# Patient Record
Sex: MALE | Race: OTHER | HISPANIC OR LATINO | URBAN - METROPOLITAN AREA
[De-identification: names, ages, dates, MRNs, and addresses within clinical notes are randomized per-mention and may not be internally consistent; named-entity substitution may affect disease eponyms.]

---

## 2017-10-26 ENCOUNTER — INPATIENT (INPATIENT)
Facility: HOSPITAL | Age: 72
LOS: 3 days | Discharge: HOME HEALTH | End: 2017-10-30
Attending: INTERNAL MEDICINE

## 2017-10-26 DIAGNOSIS — I16.1 HYPERTENSIVE EMERGENCY: ICD-10-CM

## 2017-11-01 DIAGNOSIS — E87.6 HYPOKALEMIA: ICD-10-CM

## 2017-11-01 DIAGNOSIS — I63.9 CEREBRAL INFARCTION, UNSPECIFIED: ICD-10-CM

## 2017-11-01 DIAGNOSIS — Z79.84 LONG TERM (CURRENT) USE OF ORAL HYPOGLYCEMIC DRUGS: ICD-10-CM

## 2017-11-01 DIAGNOSIS — I16.1 HYPERTENSIVE EMERGENCY: ICD-10-CM

## 2017-11-01 DIAGNOSIS — Z87.891 PERSONAL HISTORY OF NICOTINE DEPENDENCE: ICD-10-CM

## 2017-11-01 DIAGNOSIS — I67.4 HYPERTENSIVE ENCEPHALOPATHY: ICD-10-CM

## 2017-11-01 DIAGNOSIS — I10 ESSENTIAL (PRIMARY) HYPERTENSION: ICD-10-CM

## 2017-11-01 DIAGNOSIS — I73.9 PERIPHERAL VASCULAR DISEASE, UNSPECIFIED: ICD-10-CM

## 2017-11-01 DIAGNOSIS — R27.0 ATAXIA, UNSPECIFIED: ICD-10-CM

## 2017-11-01 DIAGNOSIS — R47.1 DYSARTHRIA AND ANARTHRIA: ICD-10-CM

## 2017-11-01 DIAGNOSIS — H10.9 UNSPECIFIED CONJUNCTIVITIS: ICD-10-CM

## 2017-11-01 DIAGNOSIS — Z79.02 LONG TERM (CURRENT) USE OF ANTITHROMBOTICS/ANTIPLATELETS: ICD-10-CM

## 2017-11-01 DIAGNOSIS — E11.9 TYPE 2 DIABETES MELLITUS WITHOUT COMPLICATIONS: ICD-10-CM

## 2017-11-01 DIAGNOSIS — G20 PARKINSON'S DISEASE: ICD-10-CM

## 2021-04-18 ENCOUNTER — INPATIENT (INPATIENT)
Facility: HOSPITAL | Age: 76
LOS: 1 days | Discharge: HOME HEALTH | End: 2021-04-20
Attending: INTERNAL MEDICINE | Admitting: INTERNAL MEDICINE
Payer: MEDICARE

## 2021-04-18 ENCOUNTER — TRANSCRIPTION ENCOUNTER (OUTPATIENT)
Age: 76
End: 2021-04-18

## 2021-04-18 VITALS
RESPIRATION RATE: 18 BRPM | OXYGEN SATURATION: 97 % | SYSTOLIC BLOOD PRESSURE: 151 MMHG | DIASTOLIC BLOOD PRESSURE: 68 MMHG | HEART RATE: 60 BPM | TEMPERATURE: 98 F

## 2021-04-18 DIAGNOSIS — M67.911 UNSPECIFIED DISORDER OF SYNOVIUM AND TENDON, RIGHT SHOULDER: Chronic | ICD-10-CM

## 2021-04-18 LAB
ALBUMIN SERPL ELPH-MCNC: 4.1 G/DL — SIGNIFICANT CHANGE UP (ref 3.5–5.2)
ALP SERPL-CCNC: 45 U/L — SIGNIFICANT CHANGE UP (ref 30–115)
ALT FLD-CCNC: 8 U/L — SIGNIFICANT CHANGE UP (ref 0–41)
ANION GAP SERPL CALC-SCNC: 13 MMOL/L — SIGNIFICANT CHANGE UP (ref 7–14)
APTT BLD: 29.9 SEC — SIGNIFICANT CHANGE UP (ref 27–39.2)
AST SERPL-CCNC: 14 U/L — SIGNIFICANT CHANGE UP (ref 0–41)
BASE EXCESS BLDV CALC-SCNC: 1.6 MMOL/L — SIGNIFICANT CHANGE UP (ref -2–2)
BASOPHILS # BLD AUTO: 0.06 K/UL — SIGNIFICANT CHANGE UP (ref 0–0.2)
BASOPHILS NFR BLD AUTO: 0.7 % — SIGNIFICANT CHANGE UP (ref 0–1)
BILIRUB SERPL-MCNC: 0.2 MG/DL — SIGNIFICANT CHANGE UP (ref 0.2–1.2)
BUN SERPL-MCNC: 33 MG/DL — HIGH (ref 10–20)
BUN SERPL-MCNC: 36 MG/DL — HIGH (ref 10–20)
CALCIUM SERPL-MCNC: 9 MG/DL — SIGNIFICANT CHANGE UP (ref 8.5–10.1)
CALCIUM SERPL-MCNC: 9.5 MG/DL — SIGNIFICANT CHANGE UP (ref 8.5–10.1)
CHLORIDE SERPL-SCNC: 98 MMOL/L — SIGNIFICANT CHANGE UP (ref 98–110)
CO2 SERPL-SCNC: 22 MMOL/L — SIGNIFICANT CHANGE UP (ref 17–32)
CO2 SERPL-SCNC: 24 MMOL/L — SIGNIFICANT CHANGE UP (ref 17–32)
CREAT SERPL-MCNC: 1.9 MG/DL — HIGH (ref 0.7–1.5)
CREAT SERPL-MCNC: 2.1 MG/DL — HIGH (ref 0.7–1.5)
EOSINOPHIL # BLD AUTO: 0.17 K/UL — SIGNIFICANT CHANGE UP (ref 0–0.7)
EOSINOPHIL NFR BLD AUTO: 2.1 % — SIGNIFICANT CHANGE UP (ref 0–8)
ETHANOL SERPL-MCNC: <10 MG/DL — SIGNIFICANT CHANGE UP
GAS PNL BLDV: SIGNIFICANT CHANGE UP
GLUCOSE BLDC GLUCOMTR-MCNC: 148 MG/DL — HIGH (ref 70–99)
GLUCOSE BLDC GLUCOMTR-MCNC: 88 MG/DL — SIGNIFICANT CHANGE UP (ref 70–99)
GLUCOSE SERPL-MCNC: 153 MG/DL — HIGH (ref 70–99)
GLUCOSE SERPL-MCNC: 98 MG/DL — SIGNIFICANT CHANGE UP (ref 70–99)
HCO3 BLDV-SCNC: 26 MMOL/L — SIGNIFICANT CHANGE UP (ref 22–29)
HCT VFR BLD CALC: 33.4 % — LOW (ref 42–52)
HGB BLD-MCNC: 10.7 G/DL — LOW (ref 14–18)
IMM GRANULOCYTES NFR BLD AUTO: 0.2 % — SIGNIFICANT CHANGE UP (ref 0.1–0.3)
INR BLD: 1.06 RATIO — SIGNIFICANT CHANGE UP (ref 0.65–1.3)
LACTATE BLDV-MCNC: 2.4 MMOL/L — HIGH (ref 0.5–1.6)
LYMPHOCYTES # BLD AUTO: 2.84 K/UL — SIGNIFICANT CHANGE UP (ref 1.2–3.4)
LYMPHOCYTES # BLD AUTO: 35.1 % — SIGNIFICANT CHANGE UP (ref 20.5–51.1)
MCHC RBC-ENTMCNC: 27.4 PG — SIGNIFICANT CHANGE UP (ref 27–31)
MCHC RBC-ENTMCNC: 32 G/DL — SIGNIFICANT CHANGE UP (ref 32–37)
MCV RBC AUTO: 85.6 FL — SIGNIFICANT CHANGE UP (ref 80–94)
MONOCYTES # BLD AUTO: 0.62 K/UL — HIGH (ref 0.1–0.6)
MONOCYTES NFR BLD AUTO: 7.7 % — SIGNIFICANT CHANGE UP (ref 1.7–9.3)
NEUTROPHILS # BLD AUTO: 4.39 K/UL — SIGNIFICANT CHANGE UP (ref 1.4–6.5)
NEUTROPHILS NFR BLD AUTO: 54.2 % — SIGNIFICANT CHANGE UP (ref 42.2–75.2)
NRBC # BLD: 0 /100 WBCS — SIGNIFICANT CHANGE UP (ref 0–0)
PCO2 BLDV: 43 MMHG — SIGNIFICANT CHANGE UP (ref 42–55)
PH BLDV: 7.4 — SIGNIFICANT CHANGE UP (ref 7.26–7.43)
PLATELET # BLD AUTO: 209 K/UL — SIGNIFICANT CHANGE UP (ref 130–400)
PO2 BLDV: 42 MMHG — HIGH (ref 20–40)
POTASSIUM SERPL-MCNC: 5.6 MMOL/L — HIGH (ref 3.5–5)
POTASSIUM SERPL-SCNC: 5.6 MMOL/L — HIGH (ref 3.5–5)
PROT SERPL-MCNC: 6.4 G/DL — SIGNIFICANT CHANGE UP (ref 6–8)
PROTHROM AB SERPL-ACNC: 12.2 SEC — SIGNIFICANT CHANGE UP (ref 9.95–12.87)
RBC # BLD: 3.9 M/UL — LOW (ref 4.7–6.1)
RBC # FLD: 13 % — SIGNIFICANT CHANGE UP (ref 11.5–14.5)
SAO2 % BLDV: 82 % — SIGNIFICANT CHANGE UP
SARS-COV-2 RNA SPEC QL NAA+PROBE: SIGNIFICANT CHANGE UP
SODIUM SERPL-SCNC: 133 MMOL/L — LOW (ref 135–146)
TROPONIN T SERPL-MCNC: 0.02 NG/ML — HIGH
WBC # BLD: 8.1 K/UL — SIGNIFICANT CHANGE UP (ref 4.8–10.8)
WBC # FLD AUTO: 8.1 K/UL — SIGNIFICANT CHANGE UP (ref 4.8–10.8)

## 2021-04-18 PROCEDURE — 99221 1ST HOSP IP/OBS SF/LOW 40: CPT

## 2021-04-18 PROCEDURE — 70450 CT HEAD/BRAIN W/O DYE: CPT | Mod: 26,59,MA

## 2021-04-18 PROCEDURE — 99285 EMERGENCY DEPT VISIT HI MDM: CPT

## 2021-04-18 PROCEDURE — 70496 CT ANGIOGRAPHY HEAD: CPT | Mod: 26,MA

## 2021-04-18 PROCEDURE — 70498 CT ANGIOGRAPHY NECK: CPT | Mod: 26,MA

## 2021-04-18 PROCEDURE — 0042T: CPT

## 2021-04-18 PROCEDURE — 93010 ELECTROCARDIOGRAM REPORT: CPT

## 2021-04-18 PROCEDURE — 71045 X-RAY EXAM CHEST 1 VIEW: CPT | Mod: 26

## 2021-04-18 PROCEDURE — 99231 SBSQ HOSP IP/OBS SF/LOW 25: CPT | Mod: 24,95

## 2021-04-18 RX ORDER — ASPIRIN/CALCIUM CARB/MAGNESIUM 324 MG
81 TABLET ORAL ONCE
Refills: 0 | Status: COMPLETED | OUTPATIENT
Start: 2021-04-18 | End: 2021-04-18

## 2021-04-18 RX ORDER — HEPARIN SODIUM 5000 [USP'U]/ML
5000 INJECTION INTRAVENOUS; SUBCUTANEOUS EVERY 12 HOURS
Refills: 0 | Status: DISCONTINUED | OUTPATIENT
Start: 2021-04-18 | End: 2021-04-20

## 2021-04-18 RX ORDER — ATORVASTATIN CALCIUM 80 MG/1
80 TABLET, FILM COATED ORAL AT BEDTIME
Refills: 0 | Status: DISCONTINUED | OUTPATIENT
Start: 2021-04-18 | End: 2021-04-20

## 2021-04-18 RX ORDER — CARVEDILOL PHOSPHATE 80 MG/1
1 CAPSULE, EXTENDED RELEASE ORAL
Qty: 0 | Refills: 0 | DISCHARGE

## 2021-04-18 RX ORDER — CLOPIDOGREL BISULFATE 75 MG/1
1 TABLET, FILM COATED ORAL
Qty: 0 | Refills: 0 | DISCHARGE

## 2021-04-18 RX ORDER — METHOCARBAMOL 500 MG/1
1500 TABLET, FILM COATED ORAL ONCE
Refills: 0 | Status: COMPLETED | OUTPATIENT
Start: 2021-04-18 | End: 2021-04-18

## 2021-04-18 RX ORDER — NIFEDIPINE 30 MG
1 TABLET, EXTENDED RELEASE 24 HR ORAL
Qty: 0 | Refills: 0 | DISCHARGE

## 2021-04-18 RX ORDER — CHLORHEXIDINE GLUCONATE 213 G/1000ML
1 SOLUTION TOPICAL
Refills: 0 | Status: DISCONTINUED | OUTPATIENT
Start: 2021-04-18 | End: 2021-04-20

## 2021-04-18 RX ORDER — ASPIRIN/CALCIUM CARB/MAGNESIUM 324 MG
81 TABLET ORAL DAILY
Refills: 0 | Status: DISCONTINUED | OUTPATIENT
Start: 2021-04-19 | End: 2021-04-20

## 2021-04-18 RX ORDER — METFORMIN HYDROCHLORIDE 850 MG/1
1 TABLET ORAL
Qty: 0 | Refills: 0 | DISCHARGE

## 2021-04-18 RX ORDER — CLOPIDOGREL BISULFATE 75 MG/1
75 TABLET, FILM COATED ORAL DAILY
Refills: 0 | Status: DISCONTINUED | OUTPATIENT
Start: 2021-04-18 | End: 2021-04-20

## 2021-04-18 RX ORDER — FUROSEMIDE 40 MG
40 TABLET ORAL DAILY
Refills: 0 | Status: DISCONTINUED | OUTPATIENT
Start: 2021-04-18 | End: 2021-04-18

## 2021-04-18 RX ORDER — FUROSEMIDE 40 MG
1 TABLET ORAL
Qty: 0 | Refills: 0 | DISCHARGE

## 2021-04-18 RX ORDER — ACETAMINOPHEN 500 MG
650 TABLET ORAL ONCE
Refills: 0 | Status: COMPLETED | OUTPATIENT
Start: 2021-04-18 | End: 2021-04-18

## 2021-04-18 RX ORDER — CARVEDILOL PHOSPHATE 80 MG/1
12.5 CAPSULE, EXTENDED RELEASE ORAL EVERY 12 HOURS
Refills: 0 | Status: DISCONTINUED | OUTPATIENT
Start: 2021-04-18 | End: 2021-04-20

## 2021-04-18 RX ORDER — METOCLOPRAMIDE HCL 10 MG
10 TABLET ORAL ONCE
Refills: 0 | Status: COMPLETED | OUTPATIENT
Start: 2021-04-18 | End: 2021-04-18

## 2021-04-18 RX ORDER — NIFEDIPINE 30 MG
60 TABLET, EXTENDED RELEASE 24 HR ORAL DAILY
Refills: 0 | Status: DISCONTINUED | OUTPATIENT
Start: 2021-04-18 | End: 2021-04-19

## 2021-04-18 RX ADMIN — METHOCARBAMOL 1500 MILLIGRAM(S): 500 TABLET, FILM COATED ORAL at 15:01

## 2021-04-18 RX ADMIN — Medication 10 MILLIGRAM(S): at 15:02

## 2021-04-18 RX ADMIN — Medication 650 MILLIGRAM(S): at 21:36

## 2021-04-18 RX ADMIN — Medication 81 MILLIGRAM(S): at 15:00

## 2021-04-18 RX ADMIN — ATORVASTATIN CALCIUM 80 MILLIGRAM(S): 80 TABLET, FILM COATED ORAL at 22:03

## 2021-04-18 RX ADMIN — Medication 650 MILLIGRAM(S): at 15:00

## 2021-04-18 NOTE — CONSULT NOTE ADULT - ASSESSMENT
13. Impression:  74 yo male with PMHx of DM, HTN, HLD, BPH, CVA five years ago and parkinsons presents to ED after having a headache this morning, walking slower and some slurred speech. Patient is Togolese speaking but speaks some english. PE significant for dysarthria. CT/CTA/CTP no acute pathologies.      14. Probable cause/s of Stroke: likely small vessel       15. Suggestions:   Routine stroke workup including:  obtain MRI without LETICIA  obtain LDL, Hga1c and TSH   start ASA  81 mg daily   start lipitor 80 mg nightly  tele monitoring   TTE  OT/PT/ speech/ rehab  continue plavix   Dr Mortensen note appreciated-" ASA 81 mg daily, with DVT prophylaxis, P2Y12 test to assess for plavix responsiveness, sBP 120-180 mmHg, MRI.  In-patient admission to assess for above, along with adjustment of single anti-platelet agent based on plavix responsiveness test (responsive would be < 194)."   medical management per primary team   Plan discussed with Dr Caballero        16. Disposition: ED obs

## 2021-04-18 NOTE — H&P ADULT - HISTORY OF PRESENT ILLNESS
75 year old male previous Hx CVA (R PCA-P1 stenosis) residual dysarthria right facial asymmetry, CAD s/p PCI?, HTN, HF unspecified, Parkinson's, presents with 3 days of dizziness, woke up with headaches, dizziness and gait instability this morning. Headache is describe as severe pain in the back of the head which traveled down his neck to both shoulders, pain is improved on my encounter. Weakness is described as full body weakness with difficulty ambulating. Darron is fully independent at home, does not use any assistance with ambulation. He follows with neurologist Dr. Leos in the [facility] and has an appointment next week. Denies changes in vision, changes in speaking, difficulty swallowing. Denies CP, SOB, abdominal pain, changes in bowel movements/ urination.   In ED code stroke was called. Inital vitals: Temp 98.2F, BP: 151/ 68, HR 60. RR 18 SpO2 97% on RA. NIHSS score 2. Given aspirin 81mg.   CT shows cerebral atrophy with chronic R basal ganglia CVA, L basal ganglia VR space. CT brain perfusion: No evidence of acute infarct or ischemia. CTA neck: No stenosis. No dissection. CTA brain: No stenosis or aneurysm. CTA shows R P1 stenosis.  Patient is being admitted to tele monitoring to rule out stroke.  75 year old male previous Hx CVA (R PCA-P1 stenosis) residual dysarthria right facial asymmetry, CAD s/p PCI?, HTN, HF unspecified, Parkinson's, presents with 3 days of dizziness, woke up with headaches, dizziness and gait instability this morning. Patient is Italian speaking, daughter was at bedside to help with history. Headache is describe as severe pain in the back of the head which traveled down his neck to both shoulders, pain is improved on my encounter. Weakness is described as full body weakness with difficulty ambulating. Darron is fully independent at home, does not use any assistance with ambulation. He follows with neurologist Dr. Leos in the [facility] and has an appointment next week. Denies changes in vision, changes in speaking, difficulty swallowing. Denies CP, SOB, abdominal pain, changes in bowel movements/ urination.   In ED code stroke was called. Inital vitals: Temp 98.2F, BP: 151/ 68, HR 60. RR 18 SpO2 97% on RA. NIHSS score 2. Given aspirin 81mg.   CT shows cerebral atrophy with chronic R basal ganglia CVA, L basal ganglia VR space. CT brain perfusion: No evidence of acute infarct or ischemia. CTA neck: No stenosis. No dissection. CTA brain: No stenosis or aneurysm. CTA shows R P1 stenosis.  Patient is being admitted to tele monitoring to rule out stroke.

## 2021-04-18 NOTE — H&P ADULT - NSHPLABSRESULTS_GEN_ALL_CORE
VITALS:   T(F): 98.4  HR: 70  BP: 139/63  RR: 20  SpO2: 95%    LABS:                        10.7   8.10  )-----------( 209      ( 18 Apr 2021 13:30 )             33.4     04-18    133<L>  |  98  |  36<H>  ----------------------------<  98  5.6<H>   |  22  |  2.1<H>    Ca    9.5      18 Apr 2021 13:30    TPro  6.4  /  Alb  4.1  /  TBili  0.2  /  DBili  x   /  AST  14  /  ALT  8   /  AlkPhos  45  04-18    PT/INR - ( 18 Apr 2021 13:30 )   PT: 12.20 sec;   INR: 1.06 ratio         PTT - ( 18 Apr 2021 13:30 )  PTT:29.9 sec      Troponin T, Serum: 0.02 ng/mL *H* (04-18-21 @ 13:30)      CARDIAC MARKERS ( 18 Apr 2021 13:30 )  x     / 0.02 ng/mL / x     / x     / x

## 2021-04-18 NOTE — H&P ADULT - ATTENDING COMMENTS
75 year old male previous Hx CVA (R PCA-P1 stenosis) residual dysarthria right facial asymmetry, CAD s/p PCI?, HTN, HF unspecified, Parkinson's, presents with 3 days of dizziness- admitted to r/o CVA    Pt seen and examined- smiling, in good spirits; no complaints    spoke with RN and HO    chart reviewed- agree with above    plan as per neurology    MRI    carotid, echo    PT/rehab    OOB    fall precautions    DC when cleared by neuro

## 2021-04-18 NOTE — STROKE CODE NOTE - NIH STROKE SCALE: 4. FACIAL PALSY, QM
(2) Partial paralysis (total or near-total paralysis of lower face)
(0) Normal symmetrical movements

## 2021-04-18 NOTE — H&P ADULT - NSHPPHYSICALEXAM_GEN_ALL_CORE
PHYSICAL EXAM:  GENERAL: NAD, speaks in full sentences, no signs of respiratory distress  HEAD: Atraumatic  NECK: Supple  CHEST/LUNG: Clear to auscultation bilaterally   HEART: S1, S2; RRR; No murmurs, rubs, or gallops  ABDOMEN: BS+; Soft, Non-tender   EXTREMITIES:  2+ Peripheral Pulses  PSYCH: AAOx3  NEUROLOGY: dyarthric, right facial asymmetry at baseline. 4/5 strength in UE/ LE B/L

## 2021-04-18 NOTE — ED ADULT NURSE NOTE - NSIMPLEMENTINTERV_GEN_ALL_ED
Implemented All Fall Risk Interventions:  [place] to call system. Call bell, personal items and telephone within reach. Instruct patient to call for assistance. Room bathroom lighting operational. Non-slip footwear when patient is off stretcher. Physically safe environment: no spills, clutter or unnecessary equipment. Stretcher in lowest position, wheels locked, appropriate side rails in place. Provide visual cue, wrist band, yellow gown, etc. Monitor gait and stability. Monitor for mental status changes and reorient to person, place, and time. Review medications for side effects contributing to fall risk. Reinforce activity limits and safety measures with patient and family.

## 2021-04-18 NOTE — ED PROVIDER NOTE - OBJECTIVE STATEMENT
76 yo male with a pmh of HTN, CHF, and DM presents c/o headache/dizziness. pt states to have a occipital pressure headache that radiates to his back and today started to experience dizziness described as the room spinning. denies any other symptoms including fevers, chill, headache, recent illness/travel, cough, abdominal pain, chest pain, or SOB.

## 2021-04-18 NOTE — H&P ADULT - NSICDXPASTMEDICALHX_GEN_ALL_CORE_FT
PAST MEDICAL HISTORY:  CAD (coronary artery disease)     Cerebrovascular accident (CVA)     Diabetes     Heart failure     Hypertension

## 2021-04-18 NOTE — ED PROVIDER NOTE - PROGRESS NOTE DETAILS
D/w Dr. Mortensen, neurology telestroke. Recommending no tpa at this time. Recommending BP parameters 120-180 systolic. Recommending T2Y12 test for plavix responsiveness. Will give 81 mg aspirin and continue with daily aspirin for now. Repeat MRI during hospitalization and inpatient neuro to follow. D/w neuro MARQUIS Sommer--recommending admission to telemetry.

## 2021-04-18 NOTE — PATIENT PROFILE ADULT - HARM RISK FACTORS
[facility] Department  [address]  [facility] [address]  Phone:  [phone]  Fax:  [phone]                                    Dot Ramirez   MRN: [id number]    Department:  [facility] Emergency Department   Date of Visit:  7/13/2019           After Visit Summary Signature Page    I have received my discharge instructions, and my questions have been answered. I have discussed any challenges I see with this plan with the nurse or doctor.    ..........................................................................................................................................  Patient/Patient Representative Signature      ..........................................................................................................................................  Patient Representative Print Name and Relationship to Patient    ..................................................               ................................................  Date                                   Time    ..........................................................................................................................................  Reviewed by Signature/Title    ...................................................              ..............................................  Date                                               Time          22EPIC Rev 08/18       
no

## 2021-04-18 NOTE — ED PROVIDER NOTE - PHYSICAL EXAMINATION
Gen: NAD, AOx3  Head: NCAT  HEENT: PERRL, oral mucosa moist, normal conjunctiva, oropharynx clear without exudate or erythema  Lung: CTAB, no respiratory distress, no wheezing, rales, rhonchi  CV: normal s1/s2, rrr, Normal perfusion, pulses 2+ throughout  Abd: soft, NTND, no CVA tenderness  Genitourinary: no pelvic tenderness  MSK: No edema, no visible deformities, full range of motion in all 4 extremities  Neuro: R facial droop, no nystagmus/pronator drift, coordination/sensation intact, strength 5/5 BUE/BLE, steady gait   Skin: No rash   Psych: normal affect

## 2021-04-18 NOTE — STROKE CODE NOTE - NIH STROKE SCALE: 10. DYSARTHRIA, QM
(1) Mild-to-moderate dysarthria; patient slurs at least some words and, at worst, can be understood with some difficulty
(0) Normal

## 2021-04-18 NOTE — ED PROVIDER NOTE - CLINICAL SUMMARY MEDICAL DECISION MAKING FREE TEXT BOX
I personally evaluated the patient. I reviewed the Resident’s or Physician Assistant’s note (as assigned above), and agree with the findings and plan except as documented in my note. Patient evaluated for dizziness. Stroke code called upon my initial evaluation. Labs, CT head, CTA/perfusion, EKG, cxr performed in ED. Not a tPA candidate due to LKW being last night. Mildly elevated troponin noted on labs. Admitted to telemetry for further evaluation and treatment.

## 2021-04-18 NOTE — ED PROVIDER NOTE - ATTENDING CONTRIBUTION TO CARE
74 yo M pmh as stated in chart pw headache and dizziness. Dizziness x3 days. Today noted ataxia and worsening dizziness upon waking up. LKW last night. No head trauma, no n/v/d, no abd pain, no back pain, no cp, no sob, no palpitations, no vision change, no neck pain.     Stroke code called upon my initial evaluation. Pt and wife unsure if R sided facial droop is acute or chronic.     CONSTITUTIONAL: Well-developed; well-nourished; in no acute distress. Sitting up and providing appropriate history and physical examination  SKIN: skin exam is warm and dry, no acute rash.  HEAD: Normocephalic; atraumatic.  EYES: PERRL, 3 mm bilateral, no nystagmus, EOM intact; conjunctiva and sclera clear.  ENT: No nasal discharge; airway clear.  NECK: Supple; non tender. + full passive ROM in all directions. No JVD  CARD: S1, S2 normal; no murmurs, gallops, or rubs. Regular rate and rhythm. + Symmetric Strong Pulses  RESP: No wheezes, rales or rhonchi. Good air movement bilaterally  ABD: soft; non-distended; non-tender. No Rebound, No Guarding, No signs of peritonitis, No CVA tenderness. No pulsatile abdominal mass. + Strong and Symmetric Pulses  EXT: Normal ROM. No clubbing, cyanosis or edema. Dp and Pt Pulses intact. Cap refill less than 3 seconds  NEURO: +R sided facial droop (wife and pt unsure if chronic or acute onset). Normal finger to nose, normal romberg, stable gait, no sensory or motor deficits, Alert, oriented, grossly unremarkable. GCS 15. NIH 1  PSYCH: Cooperative, appropriate.

## 2021-04-18 NOTE — H&P ADULT - ASSESSMENT
75 year old male previous Hx CVA (R PCA-P1 stenosis) residual dysarthria right facial asymmetry, CAD s/p PCI?, HTN, HF unspecified, Parkinson's, presents with 3 days of dizziness, woke up with headaches, dizziness and gait instability this morning.    #Rule out CVA  #Hx of CVA (R PCA-P1 stenosis) residual dysarthria right facial asymmetry  #Dizziness, weakness, headache   - Dizziness x 3 days, occipital headache radiating down neck to both shoulders x1 day, weakness x 1 day   - S/P stroke code, NIHSS score 2. Given aspirin 81mg.   -CT shows cerebral atrophy with chronic R basal ganglia CVA, L basal ganglia VR space.   -CT brain perfusion: No evidence of acute infarct or ischemia. CTA neck: No stenosis. No dissection. CTA brain: shows R P1 stenosis.   -Neuro on board   - MR brain without LETICIA  - Lipid profile, HbA1c, TSH   - c/w aspirin 81mg and plavix 75mg   - atorvastatin 80mg   - -180 as per neuro, holding home antihypertensive medication   - ECHO  - P2Y12 test   - tele monitoring   - Pt/OT     #CAD s/p PCI?   - daughter described test using femoral access a few years ago, does not know if stent was placed or what was done   - c/w aspirin and plavix     #CHF unspecified   - ECHO   - c/w lasix 40mg  - c/w carvedolol 12.5 BID     #Hypertension   - permissive HTN with -180 as per neuro   - holding home enalapril 20mg, Nifedipine 60 mg ER    #DM  - holding metformin   - start basal bolus if blood glucose > 180     #Hx of parkinson's   - not on medication   - follows with Dr. Leos     #DVT PPX: Lovenox 40mg   #GI PPX: not indicated  #Diet: DASH  #Dispo: acute  75 year old male previous Hx CVA (R PCA-P1 stenosis) residual dysarthria right facial asymmetry, CAD s/p PCI?, HTN, HF unspecified, Parkinson's, presents with 3 days of dizziness, woke up with headaches, dizziness and gait instability this morning.    #Rule out CVA  #Hx of CVA (R PCA-P1 stenosis) residual dysarthria right facial asymmetry  #Dizziness, weakness, headache   - Dizziness x 3 days, occipital headache radiating down neck to both shoulders x1 day, weakness x 1 day   - S/P stroke code, NIHSS score 2. Given aspirin 81mg.   -CT shows cerebral atrophy with chronic R basal ganglia CVA, L basal ganglia VR space.   -CT brain perfusion: No evidence of acute infarct or ischemia. CTA neck: No stenosis. No dissection. CTA brain: shows R P1 stenosis.   -Neuro on board   - MR brain without LETICIA  - Lipid profile, HbA1c, TSH   - c/w aspirin 81mg and plavix 75mg   - atorvastatin 80mg   - -180 as per neuro, holding home antihypertensive medication   - ECHO  - P2Y12 test   - tele monitoring   - Pt/OT     #SHEN  - Cr 2.1 on admission, unsure of baseline   - holding lasix   - urine lytes, pr/ cr ratio  - renal u/s     #CAD s/p PCI?   - daughter described test using femoral access a few years ago, does not know if stent was placed or what was done   - c/w aspirin and plavix     #CHF unspecified   - ECHO   - c/w lasix 40mg  - c/w carvedolol 12.5 BID     #Hypertension   - permissive HTN with -180 as per neuro   - holding home enalapril 20mg, Nifedipine 60 mg ER    #DM  - holding metformin   - start basal bolus if blood glucose > 180     #Hx of parkinson's   - not on medication   - follows with Dr. Leos     #DVT PPX: Lovenox 40mg   #GI PPX: not indicated  #Diet: DASH  #Dispo: acute  75 year old male previous Hx CVA (R PCA-P1 stenosis) residual dysarthria right facial asymmetry, CAD s/p PCI?, HTN, HF unspecified, Parkinson's, presents with 3 days of dizziness, woke up with headaches, dizziness and gait instability this morning.    #Rule out CVA  #Hx of CVA (R PCA-P1 stenosis) residual dysarthria right facial asymmetry  #Dizziness, weakness, headache   - Dizziness x 3 days, occipital headache radiating down neck to both shoulders x1 day, weakness x 1 day   - S/P stroke code, NIHSS score 2. Given aspirin 81mg.   -CT shows cerebral atrophy with chronic R basal ganglia CVA, L basal ganglia VR space.   -CT brain perfusion: No evidence of acute infarct or ischemia. CTA neck: No stenosis. No dissection. CTA brain: shows R P1 stenosis.   -Neuro on board   - MR brain without LETICIA  - Lipid profile, HbA1c, TSH   - c/w aspirin 81mg and plavix 75mg   - atorvastatin 80mg   - -180 as per neuro, holding home antihypertensive medication   - ECHO  - P2Y12 test   - tele monitoring   - Pt/OT     #SHEN  - Cr 2.1 on admission, unsure of baseline   - holding lasix   - urine lytes, pr/ cr ratio  - renal u/s     #CAD s/p PCI?   - daughter described test using femoral access a few years ago, does not know if stent was placed or what was done   - c/w aspirin and plavix     #CHF unspecified   - ECHO   - holding lasix due to SHEN   - c/w carvedolol 12.5 BID     #Hypertension   - permissive HTN with -180 as per neuro   - holding home enalapril 20mg, Nifedipine 60 mg ER    #DM  - holding metformin   - start basal bolus if blood glucose > 180     #Hx of parkinson's   - not on medication   - follows with Dr. Leos     #DVT PPX: Lovenox 40mg   #GI PPX: not indicated  #Diet: DASH  #Dispo: acute  75 year old male previous Hx CVA (R PCA-P1 stenosis) residual dysarthria right facial asymmetry, CAD s/p PCI?, HTN, HF unspecified, Parkinson's, presents with 3 days of dizziness, woke up with headaches, dizziness and gait instability this morning.    #Rule out CVA  #Hx of CVA (R PCA-P1 stenosis) residual dysarthria right facial asymmetry  #Dizziness, weakness, headache   - Dizziness x 3 days, occipital headache radiating down neck to both shoulders x1 day, weakness x 1 day   - S/P stroke code, NIHSS score 2. Given aspirin 81mg.   -CT shows cerebral atrophy with chronic R basal ganglia CVA, L basal ganglia VR space.   -CT brain perfusion: No evidence of acute infarct or ischemia. CTA neck: No stenosis. No dissection. CTA brain: shows R P1 stenosis.   -Neuro on board   - MR brain without LETICIA  - Lipid profile, HbA1c, TSH   - c/w aspirin 81mg and plavix 75mg   - atorvastatin 80mg   - -180 as per neuro, holding home antihypertensive medication   - ECHO  - P2Y12 test   - tele monitoring   - Pt/OT     #SHEN  - Cr 2.1 on admission, unsure of baseline   - holding lasix   - urine lytes, pr/ cr ratio  - renal u/s     #CAD s/p PCI?   - daughter described test using femoral access a few years ago, does not know if stent was placed or what was done   - c/w aspirin and plavix     #CHF unspecified   - ECHO   - holding lasix due to SHEN   - c/w carvedolol 12.5 BID     #Hypertension   - permissive HTN with -180 as per neuro   - holding home enalapril 20mg, Nifedipine 60 mg ER    #DM  - holding metformin   - start basal bolus if blood glucose > 180     #Hx of parkinson's   - not on medication   - follows with Dr. Leos     #DVT PPX: heparin sub q   #GI PPX: not indicated  #Diet: DASH  #Dispo: acute

## 2021-04-18 NOTE — CONSULT NOTE ADULT - SUBJECTIVE AND OBJECTIVE BOX
Stroke Progress Note:    NINA BARR    1. Chief Complaint:    HPI: 76 yo male with PMHx of DM, HTN, HLD, BPH, CVA five years ago and parkinsons presents to ED after having a headache this morning, walking slower and some slurred speech. Patient is [place] speaking but speaks some english. History obtained by step daughter. pt has been dizzy for three days and complaining of headache, neck, pain and back ache. This morning was noted to have slurred speech and ambulating slower than usual. Pt takes care of his adls but since last stroke mainly stays in his house.     Outpatient neurologist is Dr Leos [phone]       2. Relevant PMH:   Prior ischemic stroke/TIA[x ], Afib [ ], CAD [ ], HTN [x ], DLD [ x], DM [x ], PVD [ ], Obesity [ ],   Sedentary lifestyle [ ], CHF [ ], MISSY [ ], Cancer Hx [ ].    3. Social History: Smoking [ ], Drug Use [ ], Alcohol Use [ ], Other [ ]    4. Possible Location of Stroke: pending      5. Relevant Brain Tissue Imaging:   < from: CT Brain Stroke Protocol (04.18.21 @ 12:56) >  IMPRESSION:  No evidence of acute transcortical infarct, acute intracranial hemorrhage, or mass effect.    Findings were discussed by Dr. TERRA Jeffery with Dr. LEONIDAS GALLEGO on 4/18/2021 at 1:00 PM.    < end of copied text >      6. Relevant Cerebrovascular Imaging:   CT Angio Neck w/ IV Cont:   EXAM:  CT ANGIO BRAIN (W)AW IC        EXAM:  CT ANGIO NECK (W)AW IC        EXAM:  CT PERFUSION W MAPS IC            PROCEDURE DATE:  04/18/2021            INTERPRETATION:  HISTORY: Dizziness and ataxia. Right sided facial droop. Code stroke.    TECHNIQUE: CT angiogram study of the neck and brain were performed with thin axial slices. Sagittal and coronal maximum intensity projection reformats were provided. CT brain perfusion study with RAPID volumetric infarct analysis was also performed. 50  cc intravenous Omnipaque 350 contrast was administered and 50  cc contrast was discarded for the perfusion study. 70 cc intravenous Omnipaque 350 contrast was administered and 30 cc contrast was discarded for the CTA head/neck study.    COMPARISON:  None    FINDINGS:    CT brain perfusion:  No evidence of acute infarct or ischemia.    Tmax > 6s volume: 0 mL.  Tmax > 10s volume: 0 mL.  CBF < 30 percent volume: 0 mL. ?  Mismatch volume: 0 mL.  Mismatch ratio: None      CTA neck:  The visualized aorta and great vessels demonstrate no significant stenosis.  The common carotid arteries demonstrate no significant stenosis.  The internal carotid arteries and external carotid arteries demonstrate no significant stenosis.  The vertebral arteries demonstrate no significant stenosis.    All measurements are performed using standard NASCET criteria.      CTA brain:  The distal internal carotid arteries are patent.  The [place] is normal in appearance.  The visualized cerebral arteries are unremarkable.  The vertebrobasilar junction and basilar artery are normal.      IMPRESSION:  CT brain perfusion: No evidence of acute infarct or ischemia.    CTA neck: No stenosis. No dissection.    CTA brain: No stenosis or aneurysm.    COMMENT:  Reference per NASCET criteria for degree of stenosis: Mild: less than 50% stenosis. Moderate: 50-69% stenosis. Severe: 70-94% stenosis. Near occlusion: 95-99% stenosis.    Per RAPID assessment for acute infarct, threshold for ischemic tissue volume is Tmax > 6 sec. Threshold for infarct core volume estimate is CBF < 30 percent. Threshold for poor collateral flow or severe delayed perfusion is Tmax > 10 sec.              KARENA JEFFERY MD; Attending Radiologist  This document has been electronically signed. Apr 18 2021  1:48PM (04-18-21 @ 13:14)         7. Relevant blood tests:   pending     8. Relevant cardiac rhythm monitoring: pending    9. Relevant Cardiac Structure: (TTE/TAVIA +/-):[ ]No intracardiac thrombus/[ ] no vegetation/[ ]no [place]/EF: pending      Home Medications:      MEDICATIONS  (STANDING):      10. PT/OT/Speech/Rehab/S&Sw/ Cognitive eval results and recommendations: pending     11. Exam:    Vital Signs Last 24 Hrs  T(C): 36.8 (18 Apr 2021 12:24), Max: 36.8 (18 Apr 2021 12:24)  T(F): 98.2 (18 Apr 2021 12:24), Max: 98.2 (18 Apr 2021 12:24)  HR: 60 (18 Apr 2021 12:24) (60 - 60)  BP: 151/68 (18 Apr 2021 12:24) (151/68 - 151/68)  RR: 18 (18 Apr 2021 12:24) (18 - 18)  SpO2: 97% (18 Apr 2021 12:24) (97% - 97%)    12.   NIH STROKE SCALE  Item	                                                        Score  1 a.	Level of Consciousness	               	0  1 b. LOC Questions	                                0  1 c.	LOC Commands	                               	0  2.	Best Gaze	                                        0  3.	Visual	                                                0  4.	Facial Palsy	                                        0  5 a.	Motor Arm - Left	                                0  5 b.	Motor Arm - Right	                        0  6 a.	Motor Leg - Left	                                0  6 b.	Motor Leg - Right	                                0  7.	Limb Ataxia	                                        0  8.	Sensory	                                                0  9.	Language	                                        0  10.	Dysarthria	                                      1  11.	Extinction and Inattention  	        0  ______________________________________  TOTAL	                                                        1    Total NIHSS on admission:  1    NIHSS yesterday:      NIHSS today:     mRS: 1  0 No symptoms at all  1 No significant disability despite symptoms; able to carry out all usual duties and activities without assistance  2 Slight disability; unable to carry out all previous activities, but able to look after own affairs  3 Moderate disability; requiring some help, but able to walk without assistance  4 Moderately severe disability; unable to walk without assistance and unable to attend to own bodily needs without assistance  5 Severe disability; bedridden, incontinent and requiring constant nursing care and attention  6 Dead

## 2021-04-18 NOTE — CONSULT NOTE ADULT - ATTENDING COMMENTS
I have personally seen and examined this patient on 4/18. Acute stroke code plan was discussed with CTS service.   I have fully participated in the care of this patient.  I have reviewed all pertinent clinical information, including history, physical exam, plan and note. Patient presented with worsening ataxia and slurred speech. [place] citizen speaker but speak still seems slurred clarified by family otherwise further exam shows no focal deficit. Agree with admission for stroke work up. Brain MRI. Start ASA and check P2Y12.  I have reviewed all pertinent clinical information and reviewed all relevant imaging and diagnostic studies personally.  Recommendations as above.  Agree with above assessment except as noted.

## 2021-04-19 ENCOUNTER — TRANSCRIPTION ENCOUNTER (OUTPATIENT)
Age: 76
End: 2021-04-19

## 2021-04-19 LAB
A1C WITH ESTIMATED AVERAGE GLUCOSE RESULT: 6.3 % — HIGH (ref 4–5.6)
ALBUMIN SERPL ELPH-MCNC: 3.9 G/DL — SIGNIFICANT CHANGE UP (ref 3.5–5.2)
ALP SERPL-CCNC: 47 U/L — SIGNIFICANT CHANGE UP (ref 30–115)
ALT FLD-CCNC: 7 U/L — SIGNIFICANT CHANGE UP (ref 0–41)
ANION GAP SERPL CALC-SCNC: 11 MMOL/L — SIGNIFICANT CHANGE UP (ref 7–14)
ANION GAP SERPL CALC-SCNC: 15 MMOL/L — HIGH (ref 7–14)
APTT BLD: 30 SEC — SIGNIFICANT CHANGE UP (ref 27–39.2)
AST SERPL-CCNC: 13 U/L — SIGNIFICANT CHANGE UP (ref 0–41)
BILIRUB SERPL-MCNC: 0.3 MG/DL — SIGNIFICANT CHANGE UP (ref 0.2–1.2)
BUN SERPL-MCNC: 30 MG/DL — HIGH (ref 10–20)
CALCIUM SERPL-MCNC: 9.4 MG/DL — SIGNIFICANT CHANGE UP (ref 8.5–10.1)
CHLORIDE SERPL-SCNC: 101 MMOL/L — SIGNIFICANT CHANGE UP (ref 98–110)
CHLORIDE SERPL-SCNC: 102 MMOL/L — SIGNIFICANT CHANGE UP (ref 98–110)
CHOLEST SERPL-MCNC: 162 MG/DL — SIGNIFICANT CHANGE UP
CO2 SERPL-SCNC: 27 MMOL/L — SIGNIFICANT CHANGE UP (ref 17–32)
CREAT SERPL-MCNC: 1.8 MG/DL — HIGH (ref 0.7–1.5)
ESTIMATED AVERAGE GLUCOSE: 134 MG/DL — HIGH (ref 68–114)
GLUCOSE BLDC GLUCOMTR-MCNC: 111 MG/DL — HIGH (ref 70–99)
GLUCOSE BLDC GLUCOMTR-MCNC: 144 MG/DL — HIGH (ref 70–99)
GLUCOSE BLDC GLUCOMTR-MCNC: 167 MG/DL — HIGH (ref 70–99)
GLUCOSE SERPL-MCNC: 103 MG/DL — HIGH (ref 70–99)
HCT VFR BLD CALC: 32.7 % — LOW (ref 42–52)
HDLC SERPL-MCNC: 44 MG/DL — SIGNIFICANT CHANGE UP
HGB BLD-MCNC: 10.8 G/DL — LOW (ref 14–18)
INR BLD: 1.03 RATIO — SIGNIFICANT CHANGE UP (ref 0.65–1.3)
LIPID PNL WITH DIRECT LDL SERPL: 95 MG/DL — SIGNIFICANT CHANGE UP
MAGNESIUM SERPL-MCNC: 1.8 MG/DL — SIGNIFICANT CHANGE UP (ref 1.8–2.4)
MCHC RBC-ENTMCNC: 27.8 PG — SIGNIFICANT CHANGE UP (ref 27–31)
MCHC RBC-ENTMCNC: 33 G/DL — SIGNIFICANT CHANGE UP (ref 32–37)
MCV RBC AUTO: 84.3 FL — SIGNIFICANT CHANGE UP (ref 80–94)
NON HDL CHOLESTEROL: 118 MG/DL — SIGNIFICANT CHANGE UP
NRBC # BLD: 0 /100 WBCS — SIGNIFICANT CHANGE UP (ref 0–0)
PLATELET # BLD AUTO: 195 K/UL — SIGNIFICANT CHANGE UP (ref 130–400)
POTASSIUM SERPL-MCNC: 4.1 MMOL/L — SIGNIFICANT CHANGE UP (ref 3.5–5)
POTASSIUM SERPL-MCNC: 4.3 MMOL/L — SIGNIFICANT CHANGE UP (ref 3.5–5)
POTASSIUM SERPL-SCNC: 4.1 MMOL/L — SIGNIFICANT CHANGE UP (ref 3.5–5)
POTASSIUM SERPL-SCNC: 4.3 MMOL/L — SIGNIFICANT CHANGE UP (ref 3.5–5)
PROT SERPL-MCNC: 6.2 G/DL — SIGNIFICANT CHANGE UP (ref 6–8)
PROTHROM AB SERPL-ACNC: 11.8 SEC — SIGNIFICANT CHANGE UP (ref 9.95–12.87)
RBC # BLD: 3.88 M/UL — LOW (ref 4.7–6.1)
RBC # FLD: 13 % — SIGNIFICANT CHANGE UP (ref 11.5–14.5)
SODIUM SERPL-SCNC: 140 MMOL/L — SIGNIFICANT CHANGE UP (ref 135–146)
SODIUM SERPL-SCNC: 140 MMOL/L — SIGNIFICANT CHANGE UP (ref 135–146)
TRIGL SERPL-MCNC: 150 MG/DL — HIGH
WBC # BLD: 7.39 K/UL — SIGNIFICANT CHANGE UP (ref 4.8–10.8)
WBC # FLD AUTO: 7.39 K/UL — SIGNIFICANT CHANGE UP (ref 4.8–10.8)

## 2021-04-19 PROCEDURE — 76770 US EXAM ABDO BACK WALL COMP: CPT | Mod: 26

## 2021-04-19 PROCEDURE — 99232 SBSQ HOSP IP/OBS MODERATE 35: CPT

## 2021-04-19 PROCEDURE — 93306 TTE W/DOPPLER COMPLETE: CPT | Mod: 26

## 2021-04-19 PROCEDURE — 70551 MRI BRAIN STEM W/O DYE: CPT | Mod: 26

## 2021-04-19 RX ORDER — ASPIRIN/CALCIUM CARB/MAGNESIUM 324 MG
1 TABLET ORAL
Qty: 30 | Refills: 0
Start: 2021-04-19 | End: 2021-05-18

## 2021-04-19 RX ADMIN — Medication 81 MILLIGRAM(S): at 11:33

## 2021-04-19 RX ADMIN — CLOPIDOGREL BISULFATE 75 MILLIGRAM(S): 75 TABLET, FILM COATED ORAL at 11:33

## 2021-04-19 RX ADMIN — Medication 60 MILLIGRAM(S): at 05:27

## 2021-04-19 RX ADMIN — CARVEDILOL PHOSPHATE 12.5 MILLIGRAM(S): 80 CAPSULE, EXTENDED RELEASE ORAL at 05:27

## 2021-04-19 RX ADMIN — HEPARIN SODIUM 5000 UNIT(S): 5000 INJECTION INTRAVENOUS; SUBCUTANEOUS at 05:27

## 2021-04-19 RX ADMIN — ATORVASTATIN CALCIUM 80 MILLIGRAM(S): 80 TABLET, FILM COATED ORAL at 21:16

## 2021-04-19 RX ADMIN — HEPARIN SODIUM 5000 UNIT(S): 5000 INJECTION INTRAVENOUS; SUBCUTANEOUS at 17:12

## 2021-04-19 RX ADMIN — CARVEDILOL PHOSPHATE 12.5 MILLIGRAM(S): 80 CAPSULE, EXTENDED RELEASE ORAL at 17:12

## 2021-04-19 NOTE — DISCHARGE NOTE PROVIDER - NSDCCPCAREPLAN_GEN_ALL_CORE_FT
PRINCIPAL DISCHARGE DIAGNOSIS  Diagnosis: Stroke  Assessment and Plan of Treatment: For Possible small vessel stroke  - You were a stroke code in ED  - CT brain stroke protocol 04/18 revealed no acute process but chronic infarct in right basal ganglia  - CT angio of head and neck 04/18 negative for stenosis or aneurysm  - Neurology Dr Caballero was on board  - We performed MRI brain on 04/19  - On discharge  --> Please continue Aspirin 81mg QD and Plavix 75mg QD  --> Continue Atorvastatin 80mg QD  --> Monitor BP at home and avoid very low readings in SBP      SECONDARY DISCHARGE DIAGNOSES  Diagnosis: Elevated troponin  Assessment and Plan of Treatment: For History of CAD  - We trended troponin 04/18 0.02 -> ordered a repeat for 04/19  - ECG 04/18 NSR   - On discharge  --> Please continue Aspirin 81mg QD and Plavix 75mg QD  --> Continue Atorvastatin 80mg QD  --> Carvedilol 12.5mg BID    Diagnosis: Hypertension  Assessment and Plan of Treatment: For Hypertension  - We monitored BP and allowed SBP readings 120-180  - We continued Carvedilol 12.5mg BID and nifedipine 60mg QD  - We performed orthostatic vital signs on 04/19 that came back positive for orthostatic hypotension  - On discharge  --> Please resume anti-HTN as prescribed  --> Monitor BP at home and avoid very low readings in SBP  --> Since you had orthostatic hypotension, we recommend that you transition from lying to seated to standing positions slowly to avoid falls or light headedness    Diagnosis: Diabetes mellitus  Assessment and Plan of Treatment: For DM II  - We ordered Hba1c for 04/19  - We monitored your POCT: 04/18 88, 148  - On discharge  --> Will resume Metformin 500mg BID  --> Recommend DASH Diet and exercise for 30 minute x3-5 days/week    Diagnosis: Parkinson disease  Assessment and Plan of Treatment: For Parkinson Disease  - On discharge  --> Please follow up with Dr Leos next week for medication dosage optimization     PRINCIPAL DISCHARGE DIAGNOSIS  Diagnosis: Stroke  Assessment and Plan of Treatment: For Possible small vessel stroke  - You were a stroke code in ED  - CT brain stroke protocol 04/18 revealed no acute process but chronic infarct in right basal ganglia  - CT angio of head and neck 04/18 negative for stenosis or aneurysm  - Neurology Dr Caballero was on board  - We performed MRI brain on 04/19  - On discharge  --> Please follow up with Dr Franklin in 2 weeks  --> Please continue Aspirin 81mg QD and Plavix 75mg QD  --> Continue Atorvastatin 80mg QD  --> Monitor BP at home and avoid very low readings in SBP      SECONDARY DISCHARGE DIAGNOSES  Diagnosis: Elevated troponin  Assessment and Plan of Treatment: For History of CAD  - We trended troponin 04/18 0.02 -> ordered a repeat for 04/19  - ECG 04/18 NSR   - On discharge  --> Please continue Aspirin 81mg QD and Plavix 75mg QD  --> Continue Atorvastatin 80mg QD  --> Carvedilol 12.5mg BID    Diagnosis: Hypertension  Assessment and Plan of Treatment: For Hypertension  - We monitored BP and allowed SBP readings 120-180  - We continued Carvedilol 12.5mg BID and nifedipine 60mg QD  - We performed orthostatic vital signs on 04/19 that came back positive for orthostatic hypotension  - On discharge  --> Please resume anti-HTN as prescribed  --> Monitor BP at home and avoid very low readings in SBP  --> Since you had orthostatic hypotension, we recommend that you transition from lying to seated to standing positions slowly to avoid falls or light headedness    Diagnosis: Diabetes mellitus  Assessment and Plan of Treatment: For DM II  - We ordered Hba1c for 04/19  - We monitored your POCT: 04/18 88, 148  - On discharge  --> Will resume Metformin 500mg BID  --> Recommend DASH Diet and exercise for 30 minute x3-5 days/week    Diagnosis: Parkinson disease  Assessment and Plan of Treatment: For Parkinson Disease  - On discharge  --> Please follow up with Dr Franklin in 2 weeks

## 2021-04-19 NOTE — DISCHARGE NOTE PROVIDER - NSDCMRMEDTOKEN_GEN_ALL_CORE_FT
aspirin 81 mg oral tablet, chewable: 1 tab(s) orally once a day  carvedilol 12.5 mg oral tablet: 1 tab(s) orally 2 times a day  enalapril 20 mg oral tablet: 1 tab(s) orally once a day  Lasix 40 mg oral tablet: 1 tab(s) orally once a day  lovastatin 40 mg oral tablet: 1 tab(s) orally once a day  metFORMIN 500 mg oral tablet: 1 tab(s) orally 2 times a day  NIFEdipine 60 mg oral tablet, extended release: 1 tab(s) orally once a day  Plavix 75 mg oral tablet: 1 tab(s) orally once a day   aspirin 81 mg oral tablet, chewable: 1 tab(s) orally once a day  carvedilol 12.5 mg oral tablet: 1 tab(s) orally 2 times a day  enalapril 20 mg oral tablet: 1 tab(s) orally once a day  Lasix 40 mg oral tablet: 1 tab(s) orally once a day  lovastatin 40 mg oral tablet: 1 tab(s) orally once a day  metFORMIN 500 mg oral tablet: 1 tab(s) orally 2 times a day  NIFEdipine 60 mg oral tablet, extended release: 1 tab(s) orally once a day  Plavix 75 mg oral tablet: 1 tab(s) orally once a day  Sinemet 25 mg-250 mg oral tablet: 0.5 tab(s) orally 3 times a day for 14 days until follow up with Dr Franklin   aspirin 81 mg oral tablet, chewable: 1 tab(s) orally once a day  atorvastatin 80 mg oral tablet: 1 tab(s) orally once a day (at bedtime)  carvedilol 12.5 mg oral tablet: 1 tab(s) orally 2 times a day  enalapril 20 mg oral tablet: 1 tab(s) orally once a day  Lasix 40 mg oral tablet: 1 tab(s) orally once a day  metFORMIN 500 mg oral tablet: 1 tab(s) orally 2 times a day  NIFEdipine 60 mg oral tablet, extended release: 1 tab(s) orally once a day  Plavix 75 mg oral tablet: 1 tab(s) orally once a day  Sinemet 25 mg-250 mg oral tablet: 0.5 tab(s) orally 3 times a day for 14 days until follow up with Dr Franklin

## 2021-04-19 NOTE — DISCHARGE NOTE PROVIDER - HOSPITAL COURSE
Mr Araiza is a 75 year old male patient with Parkinson Disease, history of CVA, CAD, HTN who presented with 3 days of light headedness, headache, and gait instability, s/p stroke code in ED, found to have no acute stroke or bleed but chronic infarct in right Basal Ganglia, admitted for further investigations (including MRI brain) and monitoring. Please refer to below for a detailed hospital course:      For Possible small vessel stroke  - You were a stroke code in ED  - CT brain stroke protocol 04/18 revealed no acute process but chronic infarct in right basal ganglia  - CT angio of head and neck 04/18 negative for stenosis or aneurysm  - Neurology Dr Caballero was on board  - We performed MRI brain on 04/19  - On discharge  --> Please follow up with Dr Leos next week   --> Please continue Aspirin 81mg QD and Plavix 75mg QD  --> Continue Atorvastatin 80mg QD  --> Monitor BP at home and avoid very low readings in SBP      For History of CAD  - We trended troponin 04/18 0.02 -> ordered a repeat for 04/19  - ECG 04/18 NSR   - On discharge  --> Please continue Aspirin 81mg QD and Plavix 75mg QD  --> Continue Atorvastatin 80mg QD  --> Carvedilol 12.5mg BID      For Hypertension  - We monitored BP and allowed SBP readings 120-180  - We continued Carvedilol 12.5mg BID and nifedipine 60mg QD  - We performed orthostatic vital signs on 04/19 that came back positive for orthostatic hypotension  - On discharge  --> Please resume anti-HTN as prescribed  --> Monitor BP at home and avoid very low readings in SBP  --> Since you had orthostatic hypotension, we recommend that you transition from lying to seated to standing positions slowly to avoid falls or light headedness      For DM II  - We ordered Hba1c for 04/19  - We monitored your POCT: 04/18 88, 148  - On discharge  --> Will resume Metformin 500mg BID  --> Recommend DASH Diet and exercise for 30 minute x3-5 days/week      For Parkinson Disease  - On discharge  --> Please follow up with Dr Leos next week for medication dosage optimization Mr Araiza is a 75 year old male patient with Parkinson Disease, history of CVA, CAD, HTN who presented with 3 days of light headedness, headache, and gait instability, s/p stroke code in ED, found to have no acute stroke or bleed but chronic infarct in right Basal Ganglia, admitted for further investigations (including MRI brain) and monitoring. Please refer to below for a detailed hospital course:      For Possible small vessel stroke  - You were a stroke code in ED  - CT brain stroke protocol 04/18 revealed no acute process but chronic infarct in right basal ganglia  - CT angio of head and neck 04/18 negative for stenosis or aneurysm  - Neurology Dr Caballero was on board  - We performed MRI brain on 04/19  - On discharge  --> Please follow up with Dr Franklin in 2 weeks  --> Please continue Aspirin 81mg QD and Plavix 75mg QD  --> Continue Atorvastatin 80mg QD  --> Monitor BP at home and avoid very low readings in SBP      For History of CAD  - We trended troponin 04/18 0.02 -> ordered a repeat for 04/19  - ECG 04/18 NSR   - On discharge  --> Please continue Aspirin 81mg QD and Plavix 75mg QD  --> Continue Atorvastatin 80mg QD  --> Carvedilol 12.5mg BID      For Hypertension  - We monitored BP and allowed SBP readings 120-180  - We continued Carvedilol 12.5mg BID and nifedipine 60mg QD  - We performed orthostatic vital signs on 04/19 that came back positive for orthostatic hypotension  - On discharge  --> Please resume anti-HTN as prescribed  --> Monitor BP at home and avoid very low readings in SBP  --> Since you had orthostatic hypotension, we recommend that you transition from lying to seated to standing positions slowly to avoid falls or light headedness      For DM II  - We ordered Hba1c for 04/19  - We monitored your POCT: 04/18 88, 148  - On discharge  --> Will resume Metformin 500mg BID  --> Recommend DASH Diet and exercise for 30 minute x3-5 days/week      For Parkinson Disease  - On discharge  --> Please follow up with Dr Franklin next week for medication dosage optimization  --> Start sinemet as prescribed Mr Araiza is a 75 year old male patient with Parkinson Disease, history of CVA, CAD, HTN who presented with 3 days of light headedness, headache, and gait instability, s/p stroke code in ED, found to have no acute stroke or bleed but chronic infarct in right Basal Ganglia, admitted for further investigations (including MRI brain) and monitoring. Please refer to below for a detailed hospital course:      For Possible small vessel stroke  - You were a stroke code in ED  - CT brain stroke protocol 04/18 revealed no acute process but chronic infarct in right basal ganglia  - CT angio of head and neck 04/18 negative for stenosis or aneurysm  - Neurology Dr Caballero was on board  - We performed MRI brain on 04/19  - On discharge  --> Please follow up with Dr Franklin in 2 weeks  --> Please continue Aspirin 81mg QD and Plavix 75mg QD  --> Continue Atorvastatin 80mg QD  --> Monitor BP at home and avoid very low readings in SBP      For History of CAD  - We trended troponin 04/18 0.02 -> ordered a repeat for 04/19  - ECG 04/18 NSR   - On discharge  --> Please continue Aspirin 81mg QD and Plavix 75mg QD  --> Continue Atorvastatin 80mg QD  --> Carvedilol 12.5mg BID      For Hypertension  - We monitored BP and allowed SBP readings 120-180  - We continued Carvedilol 12.5mg BID and nifedipine 60mg QD  - We performed orthostatic vital signs on 04/19 that came back positive for orthostatic hypotension  - On discharge  --> Please resume anti-HTN as prescribed  --> Monitor BP at home and avoid very low readings in SBP  --> Since you had orthostatic hypotension, we recommend that you transition from lying to seated to standing positions slowly to avoid falls or light headedness      For DM II  - We ordered Hba1c for 04/19  - We monitored your POCT: 04/18 88, 148  - On discharge  --> Will resume Metformin 500mg BID  --> Recommend DASH Diet and exercise for 30 minute x3-5 days/week      For Parkinson Disease  - On discharge  --> Please follow up with Dr Franklin next week for medication dosage optimization  --> Start sinemet as prescribed      Intraatrial Septal aneursym  - TTE revealed intra-atrial septal aneurysm  - Please follow outpatient with cardiologist for intra atrial septal aneurysm

## 2021-04-19 NOTE — SWALLOW BEDSIDE ASSESSMENT ADULT - SLP PERTINENT HISTORY OF CURRENT PROBLEM
75 year old male previous Hx CVA (R PCA-P1 stenosis) residual dysarthria right facial asymmetry, CAD s/p PCI?, HTN, HF unspecified, Parkinson's, presents with 3 days of dizziness, woke up with headaches, dizziness and gait instability. CT shows cerebral atrophy with chronic R basal ganglia CVA, L basal ganglia

## 2021-04-19 NOTE — DISCHARGE NOTE PROVIDER - NPI NUMBER (FOR SYSADMIN USE ONLY) :
no inflammation/no vomiting/no chills/no bruising/no petechia/no pain/no scaly patches on skin/no fever
[[id number]]

## 2021-04-19 NOTE — OCCUPATIONAL THERAPY INITIAL EVALUATION ADULT - PERTINENT HX OF CURRENT PROBLEM, REHAB EVAL
75 year old male previous Hx CVA (R PCA-P1 stenosis) residual dysarthria right facial asymmetry, CAD s/p PCI?, HTN, HF unspecified, Parkinson's, presents with 3 days of dizziness, woke up with headaches, dizziness and gait instability this morning.

## 2021-04-19 NOTE — DISCHARGE NOTE PROVIDER - CARE PROVIDER_API CALL
Bar Franklin)  Neurology  [address] [place] [address], [address]  [place] [address]  Phone: [phone]  Fax: [phone]  Follow Up Time: 2 weeks

## 2021-04-19 NOTE — SWALLOW BEDSIDE ASSESSMENT ADULT - SWALLOW EVAL: DIAGNOSIS
+toleration of thins, Dysphagia diet I puree consistency, Dysphagia Diet II mechanical soft consistency with ground meat, Dysphagia Diet III Mechanical soft consistency with cut up meats. mild oral dysphagia for regular. +throat clear

## 2021-04-19 NOTE — PHYSICAL THERAPY INITIAL EVALUATION ADULT - GENERAL OBSERVATIONS, REHAB EVAL
PT IE 9:00-9:30am. Patient in NAD in supine, +telemetry, +bed alarm. RN Lindsey aware patient needs a call bell and will provide one. Patient speaks some English but is primarily [place] speaking. Therapist able to use basic language skills to communicate effectively.

## 2021-04-19 NOTE — PHYSICAL THERAPY INITIAL EVALUATION ADULT - RANGE OF MOTION EXAMINATION, REHAB EVAL
B shoulder ROM ~25% of normal range/bilateral lower extremity was ROM was WNL (within normal limits)

## 2021-04-19 NOTE — PHYSICAL THERAPY INITIAL EVALUATION ADULT - PRECAUTIONS/LIMITATIONS, REHAB EVAL
Message   Recorded as Task   Date: 10/27/2017 04:06 PM, Created By: [facility]   Task Name: Follow Up   [facility]   Regarding Patient: OSVALDO ANGLIN, Status: In Progress   Comment:    Sarika Funk - 27 Oct 2017 4:06 PM     TASK CREATED  Seen in Er for suicidal ideation please glen   Nataliya Sullivan - 27 Oct 2017 4:09 PM     TASK IN PROGRESS   [facility] - 27 Oct 2017 4:10 PM     TASK EDITED  Spoke to Dad, child is currently at [facility] [id number], no concerns at this time  Active Problems   1  Abnormal weight gain (783 1) (R63 5)  2  Allergic rhinitis (477 9) (J30 9)  3  Anxiety (300 00) (F41 9)  4  Appendicitis (541) (K37)  5  Asperger's disorder (299 80) (F84 5)  6  Attention deficit hyperactivity disorder (314 01) (F90 9)  7  Bipolar disorder (296 80) (F31 9)  8  Fungal dermatitis (111 9) (B36 9)  9  Hemoptysis (786 30) (R04 2)  10  Hypercholesterolemia (272 0) (E78 00)  11  Intermittent explosive disorder (312 34) (F63 81)  12  Knee injury, right, initial encounter (959 7) (S89 91XA)  13  Mood Lability  14  Nonspecific abnormal results of function study of thyroid (794 5) (R94 6)  15  ODD (oppositional defiant disorder) (313 81) (F91 3)  16  Psychiatric hospitalization  17  Shortness of breath (786 05) (R06 02)  18  Suicidal ideation (V62 84) (R45 851)  19  Vitamin D deficiency (268 9) (E55 9)  20  Vomiting (787 03) (R11 10)  21  Weight loss, abnormal (783 21) (R63 4)    Current Meds  1  AeroChamber Plus Bob-Vu w/Mask Miscellaneous; Therapy: 32RFD5637 to Recorded  2  Albuterol Sulfate  MCG/ACT AERS; Therapy: (Recorded:14Oct2016) to Recorded  3  Intuniv 4 MG Oral Tablet Extended Release 24 Hour (GuanFACINE HCl ER); TAKE 1   TABLET Daily at 1800; Therapy: (Recorded:97Omz7826) to Recorded  4  Ketoconazole 2 % External Cream; APPLY SPARINGLY TO AFFECTED AREA(S) TWICE   DAILY; Therapy: 39LNZ5499 to (Last Rx:15Oct2017)  Requested for: 15Oct2017 Ordered  5   Latuda 80 MG Oral Tablet; one tablet bid; Therapy: (Recorded:16Jan2017) to Recorded  6  Levothyroxine Sodium 50 MCG Oral Tablet; Therapy: 60Ntx3630 to Recorded  7  Lithium Carbonate  MG Oral Tablet Extended Release; Therapy: 13OCX0574 to Recorded  8  Loratadine 10 MG Oral Tablet; TAKE 1 TABLET DAILY; Therapy: 22NUR7045 to (Evaluate:73Xcz2767)  Requested for: 92FFR1473; Last   Rx:16Jan2017 Ordered  9  Qvar 40 MCG/ACT Inhalation Aerosol Solution; Therapy: (Recorded:14Oct2016) to Recorded  10  Vistaril 50 MG Oral Capsule (HydrOXYzine Pamoate); Therapy: (Recorded:11Qyj6270) to Recorded  11  Vitamin D 2000 UNIT Oral Capsule; TAKE 2 CAPSULE Daily; Therapy: 66QXW7204 to (Last Rx:12Jan2016)  Requested for: 12Jan2016 Ordered    Allergies   1  Amoxicillin SUSR  2   Tenex    Signatures   Electronically signed by : April Tamiko, ; Oct 27 2017  4:11PM EST                       (Author)    Electronically signed by : KENNY Marquez ; Oct 27 2017  4:31PM EST                       (Acknowledgement) aspiration precautions

## 2021-04-19 NOTE — DISCHARGE NOTE PROVIDER - NSDCFUADDINST_GEN_ALL_CORE_FT
--> Please follow up with Dr Leos next week for medication dosage optimization --> Please follow up with Dr Franklin in 2 weeks --> Please follow up with Dr Franklin in 2 weeks- Please follow outpatient with cardiologist for intra atrial septal aneurysm

## 2021-04-19 NOTE — OCCUPATIONAL THERAPY INITIAL EVALUATION ADULT - RANGE OF MOTION EXAMINATION, UPPER EXTREMITY
AROM b/l ue's wfls except b/l shoulder ~20* flexion due to h/o fall/bilateral UE Passive ROM was WFL  (within functional limits)

## 2021-04-19 NOTE — PROGRESS NOTE ADULT - ASSESSMENT
Assessment and Plan  Case of a 75 year old male patient with Parkinson Disease, history of CVA, CAD, HTN who presented with 3 days of light headedness, headache, and gait instability, s/p stroke code in ED, found to have no acute stroke or bleed but chronic infarct in right Basal Ganglia, admitted for further investigations (including MRI brain) and monitoring. Currently hemodynamically stable.      Possible small vessel stroke  * s/p Stroke code in ED  * CT brain stroke protocol 04/18 revealed no acute process but chronic infarct in right basal ganglia  * CT angio of head and neck 04/18 negative for stenosis or aneurysm    - Neurology Dr Caballero on board  - MRI brain on 04/19 no acute process  - Will discharge on 04/20  - On discharge  --> Please follow up with Dr Leos next week   --> Please continue Aspirin 81mg QD and Plavix 75mg QD  --> Continue Atorvastatin 80mg QD  --> Monitor BP at home and avoid very low readings in SBP      History of CAD  * Troponin 04/18 0.02 -> ordered a repeat for 04/19  * ECG 04/18 NSR   - On discharge  --> Please continue Aspirin 81mg QD and Plavix 75mg QD  --> Continue Atorvastatin 80mg QD  --> Carvedilol 12.5mg BID      Hypertension  Orthostatic Hypotension  * Orthostatic Vital Signs positive on 04/19  - Monitor BP with a target SBP readings 120-180  - Continue Carvedilol 12.5mg BID and hold nifedipine 60mg QD 04/19  - On discharge  --> Please resume anti-HTN as prescribed  --> Monitor BP at home and avoid very low readings in SBP  --> Since you had orthostatic hypotension, we recommend that you transition from lying to seated to standing positions slowly to avoid falls or light headedness      DM II  - Follow up Hba1c for 04/19  -  Monitor POCT: 04/18 88, 148  - On discharge  --> Will resume Metformin 500mg BID  --> Recommend DASH Diet and exercise for 30 minute x3-5 days/week      Parkinson Disease  - On discharge  --> Please follow up with Dr Leos next week for medication dosage optimization      Others  - DVT Prophylaxis: Heparin 5000 BID  - GI Prophylaxis: no indication for Pantoprazole 40mg PO QD  - Diet: Dysphagia III with thins  - Code Status: Full      Barriers to learning: NO  Discharge Planning: Patient will be discharged once BP improves and is anticipated for 04/20  Plan was communicated with medical team      Qi Schwartz MD  PGY - 1 Internal Medicine   [facility]   Assessment and Plan  Case of a 75 year old male patient with Parkinson Disease, history of CVA, CAD, HTN who presented with 3 days of light headedness, headache, and gait instability, s/p stroke code in ED, found to have no acute stroke or bleed but chronic infarct in right Basal Ganglia, admitted for further investigations (including MRI brain) and monitoring. Currently hemodynamically stable.      Possible small vessel stroke  * s/p Stroke code in ED  * CT brain stroke protocol 04/18 revealed no acute process but chronic infarct in right basal ganglia  * CT angio of head and neck 04/18 negative for stenosis or aneurysm    - Neurology Dr Caballero on board  - MRI brain on 04/19 no acute process  - Will discharge on 04/20  - On discharge  --> Please follow up with Dr Leos next week   --> Please continue Aspirin 81mg QD and Plavix 75mg QD  --> Continue Atorvastatin 80mg QD  --> Monitor BP at home and avoid very low readings in SBP      History of CAD  No History of Heart Failure  * Troponin 04/18 0.02 -> ordered a repeat for 04/19  * TTE 04/19 EF 64%, mild TR  * ECG 04/18 NSR   - On discharge  --> Please continue Aspirin 81mg QD and Plavix 75mg QD  --> Continue Atorvastatin 80mg QD  --> Carvedilol 12.5mg BID      Hypertension  Orthostatic Hypotension  * Orthostatic Vital Signs positive on 04/19  - Monitor BP with a target SBP readings 120-180  - Continue Carvedilol 12.5mg BID and hold nifedipine 60mg QD 04/19  - On discharge  --> Please resume anti-HTN as prescribed  --> Monitor BP at home and avoid very low readings in SBP  --> Since you had orthostatic hypotension, we recommend that you transition from lying to seated to standing positions slowly to avoid falls or light headedness      DM II  - Follow up Hba1c for 04/19  -  Monitor POCT: 04/18 88, 148  - On discharge  --> Will resume Metformin 500mg BID  --> Recommend DASH Diet and exercise for 30 minute x3-5 days/week      Parkinson Disease  - On discharge  --> Please follow up with Dr Leos next week for medication dosage optimization      Others  - DVT Prophylaxis: Heparin 5000 BID  - GI Prophylaxis: no indication for Pantoprazole 40mg PO QD  - Diet: Dysphagia III with thins  - Code Status: Full      Barriers to learning: NO  Discharge Planning: Patient will be discharged once BP improves and is anticipated for 04/20  Plan was communicated with medical team      Qi Schwartz MD  PGY - 1 Internal Medicine   [facility]

## 2021-04-19 NOTE — SWALLOW BEDSIDE ASSESSMENT ADULT - NS ASR SWALLOW FINDINGS DISCUS
Dr. Brandt Owen  OBSTETRICIAN/GYNECOLOGIST  [address] [address] [place] [address]  [address]  [place], [place] [id number] [phone]      Pain Management  [facility]  [address]  [place], [place] [id number] [phone]  
MD Adam/Physician/Nursing/Patient

## 2021-04-19 NOTE — OCCUPATIONAL THERAPY INITIAL EVALUATION ADULT - GENERAL OBSERVATIONS, REHAB EVAL
Pt encountered semi-redd in bed, +bed alarm, +tele, +IV lock.  Pt left as received, +bed alarm, +tele, +IV lock. (PCA made aware pt needs call bell and will provide)

## 2021-04-19 NOTE — DISCHARGE NOTE PROVIDER - NSDCFUADDAPPT_GEN_ALL_CORE_FT
--> Please follow up with Dr Leos next week for medication dosage optimization --> Please follow up with Dr Franklin in 2 weeks --> Please follow up with Dr Franklin in 2 weeks  - Please follow outpatient with cardiologist for intra atrial septal aneurysm

## 2021-04-20 ENCOUNTER — TRANSCRIPTION ENCOUNTER (OUTPATIENT)
Age: 76
End: 2021-04-20

## 2021-04-20 VITALS
RESPIRATION RATE: 18 BRPM | TEMPERATURE: 97 F | SYSTOLIC BLOOD PRESSURE: 150 MMHG | HEART RATE: 65 BPM | DIASTOLIC BLOOD PRESSURE: 64 MMHG

## 2021-04-20 LAB
ALBUMIN SERPL ELPH-MCNC: 3.9 G/DL — SIGNIFICANT CHANGE UP (ref 3.5–5.2)
ALP SERPL-CCNC: 48 U/L — SIGNIFICANT CHANGE UP (ref 30–115)
ALT FLD-CCNC: 7 U/L — SIGNIFICANT CHANGE UP (ref 0–41)
ANION GAP SERPL CALC-SCNC: 13 MMOL/L — SIGNIFICANT CHANGE UP (ref 7–14)
AST SERPL-CCNC: 14 U/L — SIGNIFICANT CHANGE UP (ref 0–41)
BILIRUB SERPL-MCNC: 0.3 MG/DL — SIGNIFICANT CHANGE UP (ref 0.2–1.2)
BUN SERPL-MCNC: 35 MG/DL — HIGH (ref 10–20)
CALCIUM SERPL-MCNC: 9.2 MG/DL — SIGNIFICANT CHANGE UP (ref 8.5–10.1)
CHLORIDE SERPL-SCNC: 104 MMOL/L — SIGNIFICANT CHANGE UP (ref 98–110)
CO2 SERPL-SCNC: 24 MMOL/L — SIGNIFICANT CHANGE UP (ref 17–32)
COVID-19 SPIKE DOMAIN AB INTERP: NEGATIVE — SIGNIFICANT CHANGE UP
COVID-19 SPIKE DOMAIN ANTIBODY RESULT: 0.69 U/ML — SIGNIFICANT CHANGE UP
CREAT SERPL-MCNC: 2.1 MG/DL — HIGH (ref 0.7–1.5)
GLUCOSE BLDC GLUCOMTR-MCNC: 135 MG/DL — HIGH (ref 70–99)
GLUCOSE BLDC GLUCOMTR-MCNC: 154 MG/DL — HIGH (ref 70–99)
GLUCOSE SERPL-MCNC: 123 MG/DL — HIGH (ref 70–99)
HCT VFR BLD CALC: 32.4 % — LOW (ref 42–52)
HGB BLD-MCNC: 10.6 G/DL — LOW (ref 14–18)
MAGNESIUM SERPL-MCNC: 2 MG/DL — SIGNIFICANT CHANGE UP (ref 1.8–2.4)
MCHC RBC-ENTMCNC: 28 PG — SIGNIFICANT CHANGE UP (ref 27–31)
MCHC RBC-ENTMCNC: 32.7 G/DL — SIGNIFICANT CHANGE UP (ref 32–37)
MCV RBC AUTO: 85.7 FL — SIGNIFICANT CHANGE UP (ref 80–94)
NRBC # BLD: 0 /100 WBCS — SIGNIFICANT CHANGE UP (ref 0–0)
PLATELET # BLD AUTO: 217 K/UL — SIGNIFICANT CHANGE UP (ref 130–400)
POTASSIUM SERPL-MCNC: 4.3 MMOL/L — SIGNIFICANT CHANGE UP (ref 3.5–5)
POTASSIUM SERPL-SCNC: 4.3 MMOL/L — SIGNIFICANT CHANGE UP (ref 3.5–5)
PROT SERPL-MCNC: 6.3 G/DL — SIGNIFICANT CHANGE UP (ref 6–8)
RBC # BLD: 3.78 M/UL — LOW (ref 4.7–6.1)
RBC # FLD: 13.1 % — SIGNIFICANT CHANGE UP (ref 11.5–14.5)
SARS-COV-2 IGG+IGM SERPL QL IA: 0.69 U/ML — SIGNIFICANT CHANGE UP
SARS-COV-2 IGG+IGM SERPL QL IA: NEGATIVE — SIGNIFICANT CHANGE UP
SODIUM SERPL-SCNC: 141 MMOL/L — SIGNIFICANT CHANGE UP (ref 135–146)
TSH SERPL-MCNC: 3.34 UIU/ML — SIGNIFICANT CHANGE UP (ref 0.27–4.2)
WBC # BLD: 7.34 K/UL — SIGNIFICANT CHANGE UP (ref 4.8–10.8)
WBC # FLD AUTO: 7.34 K/UL — SIGNIFICANT CHANGE UP (ref 4.8–10.8)

## 2021-04-20 RX ORDER — ASPIRIN/CALCIUM CARB/MAGNESIUM 324 MG
1 TABLET ORAL
Qty: 30 | Refills: 0
Start: 2021-04-20 | End: 2021-05-19

## 2021-04-20 RX ORDER — CARBIDOPA AND LEVODOPA 25; 100 MG/1; MG/1
0.5 TABLET ORAL
Qty: 21 | Refills: 0
Start: 2021-04-20 | End: 2021-05-03

## 2021-04-20 RX ORDER — ATORVASTATIN CALCIUM 80 MG/1
1 TABLET, FILM COATED ORAL
Qty: 30 | Refills: 0
Start: 2021-04-20 | End: 2021-05-19

## 2021-04-20 RX ORDER — LOVASTATIN 20 MG
1 TABLET ORAL
Qty: 0 | Refills: 0 | DISCHARGE

## 2021-04-20 RX ADMIN — CLOPIDOGREL BISULFATE 75 MILLIGRAM(S): 75 TABLET, FILM COATED ORAL at 13:01

## 2021-04-20 RX ADMIN — Medication 81 MILLIGRAM(S): at 13:01

## 2021-04-20 RX ADMIN — HEPARIN SODIUM 5000 UNIT(S): 5000 INJECTION INTRAVENOUS; SUBCUTANEOUS at 06:25

## 2021-04-20 RX ADMIN — CARVEDILOL PHOSPHATE 12.5 MILLIGRAM(S): 80 CAPSULE, EXTENDED RELEASE ORAL at 06:24

## 2021-04-20 NOTE — PROGRESS NOTE ADULT - SUBJECTIVE AND OBJECTIVE BOX
Progress Note  This morning patient was seen and examined at bedside.    Today is hospital day 1d.  Mr. Araiza is doing fine.   He reports he had a good night sleep. He still reports some headache along back of head. His appetite is adequate, and he denies nausea or vomiting. He denies any abdominal pain, diarrhea, or constipation. His last bowel movement was soft and was on 04/17. He is ambulating and denies any shortness of breath, chest pain, palpitations, or light headedness. He is voiding freely and denies urinary symptoms (dysuria, urgency, frequency, intermittence).      Vital Signs in the last 24 hours   Vitals Summary T(C): 36.7 (04-19-21 @ 12:46), Max: 37 (04-19-21 @ 05:13)  HR: 73 (04-19-21 @ 12:46) (69 - 73)  BP: 160/66 (04-19-21 @ 12:46) (139/63 - 160/66)  RR: 18 (04-19-21 @ 12:46) (18 - 20)  SpO2: 95% (04-18-21 @ 21:21) (95% - 96%)  Vent Data   Intake/ Output   04-18-21 @ 07:01  -  04-19-21 @ 07:00  --------------------------------------------------------  IN: 240 mL / OUT: 400 mL / NET: -160 mL    04-19-21 @ 07:01  -  04-19-21 @ 17:36  --------------------------------------------------------  IN: 0 mL / OUT: 700 mL / NET: -700 mL      Physical Exam  * General Appearance: Alert, cooperative, interactive, well-groomed, oriented to time, place, and person, in no acute distress  * Head: Normocephalic, facial asymmetry  * Lungs: Respirations unlabored, Good bilateral air entry, normal breath sounds (Clear to auscultation bilaterally, no audible wheezes, crackles, or rhonchi)  * Heart: Regular Rate and Rhythm, normal S1 and S2, no audible murmur, rub, or gallop  * Abdomen: Symmetric, non-distended, no scar, soft, non-tender, bowel sounds active all four quadrants, no masses, no organomegaly (no hepatosplenomegaly)  * Extremities: Extremities normal, atraumatic, no cyanosis, no lower extremity pitting edema bilaterally, adequate dorsalis pedis pulses  * Pulses: 2+ and symmetric all extremities  * Skin: Skin color, texture, turgor normal, no rashes or lesions  * Lymph nodes: Cervical, supraclavicular, and axillary nodes normal  * Motor Power: couldn't raise hands above head, 5/5 power along both lower extremities      Investigations   Laboratory Workup  - CBC:                        10.8   7.39  )-----------( 195      ( 19 Apr 2021 08:09 )             32.7     - Chemistry:  04-19    140  |  102  |  30<H>  ----------------------------<  103<H>  4.1   |  27  |  1.8<H>    Ca    9.4      19 Apr 2021 08:09  Mg     1.8     04-19    TPro  6.2  /  Alb  3.9  /  TBili  0.3  /  DBili  x   /  AST  13  /  ALT  7   /  AlkPhos  47  04-19    - Coagulation Studies:  PT/INR - ( 19 Apr 2021 08:09 )   PT: 11.80 sec;   INR: 1.03 ratio         PTT - ( 19 Apr 2021 08:09 )  PTT:30.0 sec  - ABG:    - Cardiac Markers:  CARDIAC MARKERS ( 18 Apr 2021 13:30 )  x     / 0.02 ng/mL / x     / x     / x          Current Medications  Standing Medications  aspirin  chewable 81 milliGRAM(s) Oral daily  atorvastatin 80 milliGRAM(s) Oral at bedtime  carvedilol 12.5 milliGRAM(s) Oral every 12 hours  chlorhexidine 4% Liquid 1 Application(s) Topical <User Schedule>  clopidogrel Tablet 75 milliGRAM(s) Oral daily  heparin   Injectable 5000 Unit(s) SubCutaneous every 12 hours    PRN Medications    Singles Doses Administered  (Floorstock) 2 each &lt;see task&gt; GiveOnce  (Floorstock) 1 each &lt;see task&gt; GiveOnce  (Floorstock) 3 each &lt;see task&gt; GiveOnce  (Floorstock) 1 each &lt;see task&gt; GiveOnce  acetaminophen   Tablet .. 650 milliGRAM(s) Oral once  aspirin  chewable 81 milliGRAM(s) Oral once  methocarbamol 1500 milliGRAM(s) Oral Once  metoclopramide Injectable 10 milliGRAM(s) IV Push once    
  Stroke Progress Note:  Patient examined 21    NINA BARR    1. Chief Complaint: Dysarthria    HPI: 74 yo male with PMHx of DM, HTN, HLD, BPH, CVA five years ago and parkinsons presents to ED after having a headache this morning, walking slower and some slurred speech. Patient is Malaysian speaking but speaks some english. History obtained by step daughter. pt has been dizzy for three days and complaining of headache, neck, pain and back ache. This morning was noted to have slurred speech and ambulating slower than usual. Pt takes care of his adls but since last stroke mainly stays in his house.     Outpatient neurologist is Dr eLos [phone]       2. Relevant PMH:   Prior ischemic stroke/TIA[x ], Afib [ ], CAD [ ], HTN [x ], DLD [ x], DM [x ], PVD [ ], Obesity [ ],   Sedentary lifestyle [ ], CHF [ ], MISSY [ ], Cancer Hx [ ].    3. Social History: Smoking [ ], Drug Use [ ], Alcohol Use [ ], Other [ ]    4. Possible Location of Stroke: pending      5. Relevant Brain Tissue Imaging:   < from: CT Brain Stroke Protocol (21 @ 12:56) >  IMPRESSION:  No evidence of acute transcortical infarct, acute intracranial hemorrhage, or mass effect.    Findings were discussed by Dr. TERRA Jeffery with Dr. LEONIDAS GALLEGO on 2021 at 1:00 PM.    < end of copied text >      6. Relevant Cerebrovascular Imaging:   CT Angio Neck w/ IV Cont:   EXAM:  CT ANGIO BRAIN (W)AW IC        EXAM:  CT ANGIO NECK (W)AW IC        EXAM:  CT PERFUSION W MAPS IC            PROCEDURE DATE:  2021            INTERPRETATION:  HISTORY: Dizziness and ataxia. Right sided facial droop. Code stroke.    TECHNIQUE: CT angiogram study of the neck and brain were performed with thin axial slices. Sagittal and coronal maximum intensity projection reformats were provided. CT brain perfusion study with RAPID volumetric infarct analysis was also performed. 50  cc intravenous Omnipaque 350 contrast was administered and 50  cc contrast was discarded for the perfusion study. 70 cc intravenous Omnipaque 350 contrast was administered and 30 cc contrast was discarded for the CTA head/neck study.    COMPARISON:  None    FINDINGS:    CT brain perfusion:  No evidence of acute infarct or ischemia.    Tmax > 6s volume: 0 mL.  Tmax > 10s volume: 0 mL.  CBF < 30 percent volume: 0 mL. ?  Mismatch volume: 0 mL.  Mismatch ratio: None      CTA neck:  The visualized aorta and great vessels demonstrate no significant stenosis.  The common carotid arteries demonstrate no significant stenosis.  The internal carotid arteries and external carotid arteries demonstrate no significant stenosis.  The vertebral arteries demonstrate no significant stenosis.    All measurements are performed using standard NASCET criteria.      CTA brain:  The distal internal carotid arteries are patent.  The [place] is normal in appearance.  The visualized cerebral arteries are unremarkable.  The vertebrobasilar junction and basilar artery are normal.      IMPRESSION:  CT brain perfusion: No evidence of acute infarct or ischemia.    CTA neck: No stenosis. No dissection.    CTA brain: No stenosis or aneurysm.    COMMENT:  Reference per NASCET criteria for degree of stenosis: Mild: less than 50% stenosis. Moderate: 50-69% stenosis. Severe: 70-94% stenosis. Near occlusion: 95-99% stenosis.    Per RAPID assessment for acute infarct, threshold for ischemic tissue volume is Tmax > 6 sec. Threshold for infarct core volume estimate is CBF < 30 percent. Threshold for poor collateral flow or severe delayed perfusion is Tmax > 10 sec.    KARENA JEFFERY MD; Attending Radiologist  This document has been electronically signed. 2021  1:48PM (21 @ 13:14)     < from: MR Head No Cont (21 @ 14:18) >      No acute intracranial pathology.    Stable moderate chronic microvascular ischemic changes with superimposed prominent perivascular spaces.    < end of copied text >      7. Relevant blood tests:    Lipid Profile in AM (21 @ 08:09)    Cholesterol, Serum: 162 mg/dL    Triglycerides, Serum: 150 mg/dL    HDL Cholesterol, Serum: 44 mg/dL    Non HDL Cholesterol: 118    LDL Cholesterol Calculated: 95 mg/dL        8. Relevant cardiac rhythm monitorin. Relevant Cardiac Structure: (TTE/TAVIA +/-):[ ]No intracardiac thrombus/[ ] no vegetation/[ ]no [place]/EF: pending      < from: TTE Echo Complete w/o Contrast w/ Doppler (21 @ 16:02) >  Summary:   1. Normal global left ventricular systolic function.   2. LV Ejection Fraction by Heredia's Method with a biplane EF of 64 %.   3. Normal left ventricular internal cavity size.   4. The left ventricular diastolic function could not be assessed in this study.   5. Normal left atrial size.   6. Normal right atrial size.   7. No evidence of mitral valve regurgitation.   8. Mild tricuspid regurgitation.   9. Estimated pulmonary artery systolic pressure is 38.1 mmHg assuming a right atrial pressure of 3 mmHg, which is consistent with borderline pulmonary hypertension.  10. Intra-atrial septal aneurysm.  11. LA volume Index is 32.9 ml/m² ml/m2.    < end of copied text >      Home Medications:  carvedilol 12.5 mg oral tablet: 1 tab(s) orally 2 times a day (2021 21:15)  enalapril 20 mg oral tablet: 1 tab(s) orally once a day (2021 21:15)  Lasix 40 mg oral tablet: 1 tab(s) orally once a day (2021 21:14)  lovastatin 40 mg oral tablet: 1 tab(s) orally once a day (2021 21:15)  metFORMIN 500 mg oral tablet: 1 tab(s) orally 2 times a day (2021 21:15)  NIFEdipine 60 mg oral tablet, extended release: 1 tab(s) orally once a day (2021 21:15)  Plavix 75 mg oral tablet: 1 tab(s) orally once a day (2021 21:15)    MEDICATIONS  (STANDING):  aspirin  chewable 81 milliGRAM(s) Oral daily  atorvastatin 80 milliGRAM(s) Oral at bedtime  carvedilol 12.5 milliGRAM(s) Oral every 12 hours  chlorhexidine 4% Liquid 1 Application(s) Topical <User Schedule>  clopidogrel Tablet 75 milliGRAM(s) Oral daily  heparin   Injectable 5000 Unit(s) SubCutaneous every 12 hours        10. PT/OT/Speech/Rehab/S&Sw/ Cognitive eval results and recommendations: pending     11. Exam:    Vital Signs Last 24 Hrs  T(C): 36.8 (2021 12:24), Max: 36.8 (2021 12:24)  T(F): 98.2 (2021 12:24), Max: 98.2 (2021 12:24)  HR: 60 (2021 12:24) (60 - 60)  BP: 151/68 (2021 12:24) (151/68 - 151/68)  RR: 18 (2021 12:24) (18 - 18)  SpO2: 97% (2021 12:24) (97% - 97%)    12.   NIH STROKE SCALE  Item	                                                        Score  1 a.	Level of Consciousness	               	0  1 b. LOC Questions	                                0  1 c.	LOC Commands	                               	0  2.	Best Gaze	                                        0  3.	Visual	                                                0  4.	Facial Palsy	                                        1  5 a.	Motor Arm - Left	                                0  5 b.	Motor Arm - Right	                        0  6 a.	Motor Leg - Left	                                0  6 b.	Motor Leg - Right	                                0  7.	Limb Ataxia	                                        0  8.	Sensory	                                                0  9.	Language	                                        0  10.	Dysarthria	                                      1  11.	Extinction and Inattention  	        0  ______________________________________  TOTAL	                                                        2    Total NIHSS on admission:  1    NIHSS yesterday:      NIHSS today:     mRS: 1  0 No symptoms at all  1 No significant disability despite symptoms; able to carry out all usual duties and activities without assistance  2 Slight disability; unable to carry out all previous activities, but able to look after own affairs  3 Moderate disability; requiring some help, but able to walk without assistance  4 Moderately severe disability; unable to walk without assistance and unable to attend to own bodily needs without assistance  5 Severe disability; bedridden, incontinent and requiring constant nursing care and attention  6 Dead    
  Telestroke    75 yoM previous Hx CVA (R PCA-P1 stenosis), HTN, Parkinson's, presents with 3 days of dizziness, woke up with headaches, dizziness and gait instability this morning (?R facial asymmetry).  Upon presentation to [facility] ED (Dr. Monie Funk), NIHSS 2, 151/68, HR 60 NSR, face symmetric, no nystagmus, no diplopia, otherwise neurologically intact.  CT shows cerebral atrophy with chronic R basal ganglia CVA, L basal ganglia VR space.  CTP negative.  CTA shows R P1 stenosis.  Bloodwork pending, weight 186 lbs.  Patient takes plavix at home (in addition to lasix, carvedilol, enalapril).  Recommend:  ASA 81 mg daily, with DVT prophylaxis, P2Y12 test to assess for plavix responsiveness, sBP 120-180 mmHg, MRI.  In-patient admission to assess for above, along with adjustment of single anti-platelet agent based on plavix responsiveness test (responsive would be < 194).    Darell Mortensen MD  [facility]    
Patient was seen and examined. Spoke with HO. Chart reviewed.  No events overnight.  Vital Signs Last 24 Hrs  T(F): 98.7 (20 Apr 2021 05:10), Max: 98.7 (20 Apr 2021 05:10)  HR: 60 (20 Apr 2021 05:10) (60 - 73)  BP: 143/65 (20 Apr 2021 05:10) (131/61 - 160/66)  SpO2: 100% (20 Apr 2021 05:10) (100% - 100%)  MEDICATIONS  (STANDING):  aspirin  chewable 81 milliGRAM(s) Oral daily  atorvastatin 80 milliGRAM(s) Oral at bedtime  carvedilol 12.5 milliGRAM(s) Oral every 12 hours  chlorhexidine 4% Liquid 1 Application(s) Topical <User Schedule>  clopidogrel Tablet 75 milliGRAM(s) Oral daily  heparin   Injectable 5000 Unit(s) SubCutaneous every 12 hours    MEDICATIONS  (PRN):    Labs:                        10.6   7.34  )-----------( 217      ( 20 Apr 2021 06:15 )             32.4                         10.8   7.39  )-----------( 195      ( 19 Apr 2021 08:09 )             32.7     20 Apr 2021 06:15    141    |  104    |  35     ----------------------------<  123    4.3     |  24     |  2.1    19 Apr 2021 08:09    140    |  102    |  30     ----------------------------<  103    4.1     |  27     |  1.8      Ca    9.2        20 Apr 2021 06:15  Ca    9.4        19 Apr 2021 08:09  Mg     2.0       20 Apr 2021 06:15  Mg     1.8       19 Apr 2021 08:09    TPro  6.3    /  Alb  3.9    /  TBili  0.3    /  DBili  x      /  AST  14     /  ALT  7      /  AlkPhos  48     20 Apr 2021 06:15  TPro  6.2    /  Alb  3.9    /  TBili  0.3    /  DBili  x      /  AST  13     /  ALT  7      /  AlkPhos  47     19 Apr 2021 08:09    PT/INR - ( 19 Apr 2021 08:09 )   PT: 11.80 sec;   INR: 1.03 ratio         PTT - ( 19 Apr 2021 08:09 )  PTT:30.0 sec      General: comfortable, NAD  in good spirits  sitting in chair        A/P:  75 year old male previous Hx CVA (R PCA-P1 stenosis) residual dysarthria right facial asymmetry, CAD s/p PCI?, HTN, HF unspecified, Parkinson's, presents with 3 days of dizziness- admitted to r/o CVA    MRI unremarkable    PT/rehab    OOB    fall precautions    DC today- f/u with neurology as outpt    DVT prophylaxis  Decubitus prevention- all measures as per RN protocol  Please call or text me with any questions or updates

## 2021-04-20 NOTE — PROGRESS NOTE ADULT - ASSESSMENT
13. Impression:  76 yo male with PMHx of DM, HTN, HLD, BPH, CVA five years ago and parkinsons presents to ED after having a headache this morning, walking slower and some slurred speech. Patient is Albanian speaking but speaks some english. PE significant for dysarthria. CT/CTA/CTP no acute pathologies.  MRI brain no acute stroke, moderate chronic microvascular changes.    14. Probable cause/s of dysarthria, may be related to parkinson disease.      15. Suggestions:   1.  Continue antiplatelet therapy and statin for stroke prevention.  2.  Start sinemet one-half tab three times a day and f/u with his outpatient neurologist.  3.  PT/OT/Speech evaluation.  4.  Outpatient neurologic f/u in 2-3 weeks.

## 2021-04-20 NOTE — DISCHARGE NOTE NURSING/CASE MANAGEMENT/SOCIAL WORK - NSDCFUADDAPPT_GEN_ALL_CORE_FT
--> Please follow up with Dr Franklin in 2 weeks  - Please follow outpatient with cardiologist for intra atrial septal aneurysm

## 2021-04-20 NOTE — DISCHARGE NOTE NURSING/CASE MANAGEMENT/SOCIAL WORK - PATIENT PORTAL LINK FT
You can access the FollowMyHealth Patient Portal offered by [facility] by registering at the following website: [email or web address]. By joining [facility]’s FollowMyHealth portal, you will also be able to view your health information using other applications (apps) compatible with our system.

## 2021-04-23 PROBLEM — I25.10 ATHEROSCLEROTIC HEART DISEASE OF NATIVE CORONARY ARTERY WITHOUT ANGINA PECTORIS: Chronic | Status: ACTIVE | Noted: 2021-04-18

## 2021-04-23 PROBLEM — E11.9 TYPE 2 DIABETES MELLITUS WITHOUT COMPLICATIONS: Chronic | Status: ACTIVE | Noted: 2021-04-18

## 2021-04-23 PROBLEM — Z00.00 ENCOUNTER FOR PREVENTIVE HEALTH EXAMINATION: Status: ACTIVE | Noted: 2021-04-23

## 2021-04-23 PROBLEM — I50.9 HEART FAILURE, UNSPECIFIED: Chronic | Status: ACTIVE | Noted: 2021-04-18

## 2021-04-23 PROBLEM — I63.9 CEREBRAL INFARCTION, UNSPECIFIED: Chronic | Status: ACTIVE | Noted: 2021-04-18

## 2021-04-23 PROBLEM — I10 ESSENTIAL (PRIMARY) HYPERTENSION: Chronic | Status: ACTIVE | Noted: 2021-04-18

## 2021-05-05 DIAGNOSIS — I25.10 ATHEROSCLEROTIC HEART DISEASE OF NATIVE CORONARY ARTERY WITHOUT ANGINA PECTORIS: ICD-10-CM

## 2021-05-05 DIAGNOSIS — N40.0 BENIGN PROSTATIC HYPERPLASIA WITHOUT LOWER URINARY TRACT SYMPTOMS: ICD-10-CM

## 2021-05-05 DIAGNOSIS — Z79.84 LONG TERM (CURRENT) USE OF ORAL HYPOGLYCEMIC DRUGS: ICD-10-CM

## 2021-05-05 DIAGNOSIS — N17.9 ACUTE KIDNEY FAILURE, UNSPECIFIED: ICD-10-CM

## 2021-05-05 DIAGNOSIS — I95.1 ORTHOSTATIC HYPOTENSION: ICD-10-CM

## 2021-05-05 DIAGNOSIS — Z87.891 PERSONAL HISTORY OF NICOTINE DEPENDENCE: ICD-10-CM

## 2021-05-05 DIAGNOSIS — E78.5 HYPERLIPIDEMIA, UNSPECIFIED: ICD-10-CM

## 2021-05-05 DIAGNOSIS — I50.9 HEART FAILURE, UNSPECIFIED: ICD-10-CM

## 2021-05-05 DIAGNOSIS — G20 PARKINSON'S DISEASE: ICD-10-CM

## 2021-05-05 DIAGNOSIS — R42 DIZZINESS AND GIDDINESS: ICD-10-CM

## 2021-05-05 DIAGNOSIS — E11.9 TYPE 2 DIABETES MELLITUS WITHOUT COMPLICATIONS: ICD-10-CM

## 2021-05-05 DIAGNOSIS — I69.322 DYSARTHRIA FOLLOWING CEREBRAL INFARCTION: ICD-10-CM

## 2021-05-05 DIAGNOSIS — I25.3 ANEURYSM OF HEART: ICD-10-CM

## 2021-07-08 ENCOUNTER — APPOINTMENT (OUTPATIENT)
Dept: NEUROLOGY | Facility: CLINIC | Age: 76
End: 2021-07-08
Payer: MEDICARE

## 2021-07-08 VITALS
TEMPERATURE: 97.3 F | OXYGEN SATURATION: 98 % | BODY MASS INDEX: 30.65 KG/M2 | HEART RATE: 54 BPM | DIASTOLIC BLOOD PRESSURE: 70 MMHG | SYSTOLIC BLOOD PRESSURE: 137 MMHG | WEIGHT: 173 LBS | HEIGHT: 63 IN

## 2021-07-08 DIAGNOSIS — Z86.73 PERSONAL HISTORY OF TRANSIENT ISCHEMIC ATTACK (TIA), AND CEREBRAL INFARCTION W/OUT RESIDUAL DEFICITS: ICD-10-CM

## 2021-07-08 DIAGNOSIS — E78.5 HYPERLIPIDEMIA, UNSPECIFIED: ICD-10-CM

## 2021-07-08 DIAGNOSIS — E11.9 TYPE 2 DIABETES MELLITUS W/OUT COMPLICATIONS: ICD-10-CM

## 2021-07-08 DIAGNOSIS — I10 ESSENTIAL (PRIMARY) HYPERTENSION: ICD-10-CM

## 2021-07-08 DIAGNOSIS — R42 DIZZINESS AND GIDDINESS: ICD-10-CM

## 2021-07-08 PROCEDURE — 99072 ADDL SUPL MATRL&STAF TM PHE: CPT

## 2021-07-08 PROCEDURE — 99215 OFFICE O/P EST HI 40 MIN: CPT

## 2021-07-08 RX ORDER — ENALAPRIL MALEATE 20 MG/1
20 TABLET ORAL
Refills: 0 | Status: ACTIVE | COMMUNITY

## 2021-07-08 RX ORDER — CARVEDILOL 12.5 MG/1
12.5 TABLET, FILM COATED ORAL
Refills: 0 | Status: ACTIVE | COMMUNITY

## 2021-07-08 RX ORDER — ASPIRIN 81 MG
81 TABLET, DELAYED RELEASE (ENTERIC COATED) ORAL
Refills: 0 | Status: ACTIVE | COMMUNITY

## 2021-07-08 RX ORDER — NIFEDIPINE 60 MG
60 TABLET, EXTENDED RELEASE ORAL
Refills: 0 | Status: ACTIVE | COMMUNITY

## 2021-07-08 RX ORDER — FUROSEMIDE 40 MG/1
40 TABLET ORAL
Refills: 0 | Status: ACTIVE | COMMUNITY

## 2021-07-08 RX ORDER — CLOPIDOGREL 75 MG/1
75 TABLET, FILM COATED ORAL
Refills: 0 | Status: ACTIVE | COMMUNITY

## 2021-07-08 RX ORDER — ATORVASTATIN CALCIUM 80 MG/1
80 TABLET, FILM COATED ORAL
Refills: 0 | Status: ACTIVE | COMMUNITY

## 2021-07-08 NOTE — PHYSICAL EXAM
[FreeTextEntry1] : cogwheeling at the wrists.  \par Normal blink rate.\par Decreased left arm swing.\par No rest tremor seen.\par No shuffling.\par \par mild right peripheral 7th, chronic\par \par BP = 137/70 p = 54 sitting\par BP = 117/68 p =58 standing for a minute\par BP = 114/67, p = 57 standing for two min\par \par \par petit pike and horizontal canal maneuvers were negative.

## 2021-07-08 NOTE — ASSESSMENT
[FreeTextEntry1] : Parkinsonism, unclear response to low dose levodopa.  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  For now continue carbidopa-levodopa 25/100 tid.  I discussed optiimizing dosing 1/2 -1 hour prior to meal or 1-2 hour after meal.\par 2.  Syncope precautions.\par 3.  Referral for parkinson clinic with Dr. Duron.\par 4.  Continue efforts at stroke prevention aspirin 81 mg/day, plavix 75 mg/day and atorvastatin.

## 2021-07-08 NOTE — HISTORY OF PRESENT ILLNESS
[FreeTextEntry1] : Pt is 75 yo RH male with parkinson disease dx'd in 2017 a year after having a stroke in 2016.  Daughter states was mild stroke and he recovered.  He had a droop in his face many years ago when he was young but she states that wasn't related to the stroke.  She states he recovered from the stroke.  Saw general neurologist but had very mild symptoms and didn't start medication.  Daughter states the parkinson symptoms worsened.  In April hospitalization started on sinemet, initially very nauseated.  Then began to tolerate.  When daughter stopped it for a week at neurologist recommendation and she didn't notice a difference.  \par \par She states there has been a general decline.\par He gets medications before breakfast, then at noon, then 8 pm after dinner.\par \par Moderate microvascular changes on MRI.\par \par He has had falls at home.  Had 3 big falls, one hit face.  Last fall was 3 weeks ago.  One of falls occurred when he stood up and felt dizzy.  Daughter notes prior neurologist and his cardiologist noted BP drops when standing.\par Daughter states they stopped metformin.   Has echocardiogram pending. \par

## 2021-08-30 NOTE — ED ADULT NURSE NOTE - NS ED NURSE LEVEL OF CONSCIOUSNESS AFFECT
Plan: Apply Sunscreen 30 SPF or greater, ideally broad spectrum with zinc or titanium as the active ingredient, daily to sun exposed areas. Recommended ELTA MD UV Clear as a great example of this. Can be purchased in clinic or on [employer] Detail Level: Zone Calm

## 2021-11-10 ENCOUNTER — APPOINTMENT (OUTPATIENT)
Dept: NEUROLOGY | Facility: CLINIC | Age: 76
End: 2021-11-10

## 2021-12-08 ENCOUNTER — APPOINTMENT (OUTPATIENT)
Dept: NEUROLOGY | Facility: CLINIC | Age: 76
End: 2021-12-08
Payer: MEDICARE

## 2021-12-08 ENCOUNTER — NON-APPOINTMENT (OUTPATIENT)
Age: 76
End: 2021-12-08

## 2021-12-08 VITALS
OXYGEN SATURATION: 98 % | WEIGHT: 194 LBS | TEMPERATURE: 98 F | BODY MASS INDEX: 34.38 KG/M2 | HEART RATE: 55 BPM | HEIGHT: 63 IN | DIASTOLIC BLOOD PRESSURE: 58 MMHG | SYSTOLIC BLOOD PRESSURE: 96 MMHG

## 2021-12-08 PROCEDURE — 99215 OFFICE O/P EST HI 40 MIN: CPT

## 2021-12-08 RX ORDER — CHLORHEXIDINE GLUCONATE 4 %
5 LIQUID (ML) TOPICAL
Qty: 90 | Refills: 1 | Status: ACTIVE | COMMUNITY
Start: 2021-12-08 | End: 1900-01-01

## 2021-12-08 RX ORDER — CARBIDOPA AND LEVODOPA 25; 100 MG/1; MG/1
25-100 TABLET, ORALLY DISINTEGRATING ORAL
Qty: 270 | Refills: 3 | Status: DISCONTINUED | COMMUNITY
End: 2021-12-08

## 2021-12-08 NOTE — PHYSICAL EXAM
[Person] : oriented to person [Place] : oriented to place [Time] : oriented to time [Naming Objects] : no difficulty naming common objects [Cranial Nerves Optic (II)] : visual acuity intact bilaterally,  visual fields full to confrontation, pupils equal round and reactive to light [Cranial Nerves Oculomotor (III)] : extraocular motion intact [Cranial Nerves Facial (VII)] : face symmetrical [Comprehension] : comprehension intact [Cranial Nerves Trigeminal (V)] : facial sensation intact symmetrically [Cranial Nerves Glossopharyngeal (IX)] : tongue and palate midline [Cranial Nerves Accessory (XI - Cranial And Spinal)] : head turning and shoulder shrug symmetric [Cranial Nerves Hypoglossal (XII)] : there was no tongue deviation with protrusion [Motor Strength] : muscle strength was normal in all four extremities [Concentration Intact] : a decrease in concentrating ability was observed [Paresis Pronator Drift Right-Sided] : no pronator drift on the right [Paresis Pronator Drift Left-Sided] : no pronator drift on the left [FreeTextEntry6] : UE movement restricted due to shoulder pain  [FreeTextEntry1] : Face is masked with decreased blinking rate. Voice is hypophonic. Bradyphrenia is present.\par No significant resting tremor noted.\par Mild rigidity, right more than left. Mild neck rigidity.\par Moderate bradykinesia, right more than left, with finger tapping, rapid alternating and sequential movements. \par Leg agility is normal.\par Toe tapping is impaired bilaterally. \par Took two attempts to stand with arms crossed.\par Gait is with good stride length and speed, good arm swing bilaterally. No significant shuffling or freezing of gait.\par Postural reflexes are intact.\par

## 2021-12-08 NOTE — HISTORY OF PRESENT ILLNESS
[FreeTextEntry1] : Victoriano Araiza is a 76 year old M with a PMHx of Stroke (2016), Parkinson's disease diagnosed in 2017, DM, and HLD who presents for initial evaluation in the Movement Disorders Clinic at [facility]. He is accompanied by his step daughter who provides collateral history and [place]an translation.\par \par He was diagnosed with PD in 2017 by another Neurologist when he presented with trembling. Initially he was not provided any medication as symptoms were mild. He was started on Sinemet this year when he was admitted in [facility] for fall. As per daughter he acts out , fights and talks in sleep for past few years. He also suffers from intermittent constipation relieved by dietary modification. His step-daughter noticed some tremor specially when he tries to eat. He had multiple falls in the past, last one was around April/May this year. His walking has improved since he was started on Sinemet. Initially, he had some nausea, but tolerating it well. He takes Sinemet at 8 am, 2 pm, and 8 pm. His daughter also noticed softening of voice, and his wife constantly tells him to speak louder. He c/o dizziness and was told that his BP tends to drop on standing up and his nephrologist is aware of this. He is no longer on BP medications. \par \par When the patient fell the last two times, he was dizzy and lightheaded.\par No history of hallucinations.\par There is intermittent confusion, but his memory is still good.\par Mood is good.\par No drooling.\par He has never tried Melatonin.\par \par No Keila scan done.\par MRI of the brain was reviewed from 4/2021 and showed significant diffuse atrophy, with moderate microvascular disease and midbrain atrophy.

## 2021-12-08 NOTE — ASSESSMENT
[FreeTextEntry1] : Victoriano Araiza is a 76 year old M with a PMHx of Stroke (2016), Parkinson's disease diagnosed in 2017, DM, and HLD who presents for initial evaluation in the Movement Disorders Clinic at [facility]. He is accompanied by his step daughter who provides collateral history and Khmer translation.\par \par The patient's history and physical examination are consistent with Parkinsonism. 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 He also has constipation and RBD symptoms. \par \par MRI of brain from 04/2021: significant atrophy including midbrain, severe chronic microvascular changes.\par \par Recommendations:\par - Switch from Sinemet IR to Sinemet 25-100mg ER 4 times a day at [id number]\par - Dietary Modification: Mediterranean diet, multivitamins discussed\par - Encouraged to increase exercise and physical activity and maintain an active social and intellectual life.\par - Speech therapy referral\par - Physical therapy referral\par - BP monitoring instructions provided. Increase water and salt intake.\par - Discuss BP medications with PCP/nephrology. Advised to send updated med list.\par - Will start Melatonin 5 mg at bedtime for REM sleep disorder.\par - Options for Dopamine scan has been discussed with patient's daughter. She has been advised that the scan is not going to change the course of treatment and has opted to hold off for now. From a stroke standpoint patient only needs one antiplatelet. Would clarify with Cardiology.\par - Continue Aspirin and Plavix for secondary stroke prevention\par \par RTC 4 months\par \par All questions were answered to the best of my knowledge. I spent 60 minutes with this patient.\par

## 2022-05-25 ENCOUNTER — APPOINTMENT (OUTPATIENT)
Dept: NEUROLOGY | Facility: CLINIC | Age: 77
End: 2022-05-25

## 2022-10-24 ENCOUNTER — RX RENEWAL (OUTPATIENT)
Age: 77
End: 2022-10-24

## 2023-01-25 ENCOUNTER — APPOINTMENT (OUTPATIENT)
Dept: NEUROLOGY | Facility: CLINIC | Age: 78
End: 2023-01-25

## 2023-06-14 ENCOUNTER — APPOINTMENT (OUTPATIENT)
Dept: NEUROLOGY | Facility: CLINIC | Age: 78
End: 2023-06-14
Payer: MEDICARE

## 2023-06-14 VITALS
BODY MASS INDEX: 33.49 KG/M2 | SYSTOLIC BLOOD PRESSURE: 147 MMHG | HEIGHT: 63 IN | WEIGHT: 189 LBS | HEART RATE: 73 BPM | DIASTOLIC BLOOD PRESSURE: 71 MMHG

## 2023-06-14 DIAGNOSIS — R42 DIZZINESS AND GIDDINESS: ICD-10-CM

## 2023-06-14 DIAGNOSIS — Z86.79 PERSONAL HISTORY OF OTHER DISEASES OF THE CIRCULATORY SYSTEM: ICD-10-CM

## 2023-06-14 DIAGNOSIS — G20 PARKINSON'S DISEASE: ICD-10-CM

## 2023-06-14 DIAGNOSIS — G47.52 REM SLEEP BEHAVIOR DISORDER: ICD-10-CM

## 2023-06-14 PROCEDURE — 99213 OFFICE O/P EST LOW 20 MIN: CPT

## 2023-06-14 NOTE — PHYSICAL EXAM
[Person] : oriented to person [Place] : oriented to place [Time] : oriented to time [Naming Objects] : no difficulty naming common objects [Comprehension] : comprehension intact [Cranial Nerves Optic (II)] : visual acuity intact bilaterally,  visual fields full to confrontation, pupils equal round and reactive to light [Cranial Nerves Oculomotor (III)] : extraocular motion intact [Cranial Nerves Trigeminal (V)] : facial sensation intact symmetrically [Cranial Nerves Facial (VII)] : face symmetrical [Cranial Nerves Glossopharyngeal (IX)] : tongue and palate midline [Cranial Nerves Accessory (XI - Cranial And Spinal)] : head turning and shoulder shrug symmetric [Cranial Nerves Hypoglossal (XII)] : there was no tongue deviation with protrusion [Motor Strength] : muscle strength was normal in all four extremities [Concentration Intact] : a decrease in concentrating ability was observed [Paresis Pronator Drift Right-Sided] : no pronator drift on the right [Paresis Pronator Drift Left-Sided] : no pronator drift on the left [FreeTextEntry6] : bilateral frozen shoulders [FreeTextEntry1] : Face is masked with decreased blinking rate. Voice is hypophonic. Bradyphrenia is present.\par No significant resting tremor noted.\par No significant rigidity.\par Slight bradykinesia, right more than left, with finger tapping, rapid alternating and sequential movements. \par Leg agility is normal.\par Toe tapping is impaired bilaterally. \par Difficulty getting off the exam table and on to the floor.\par Gait is with decreased stride length, but good speed, good arm swing bilaterally. Slight shuffling. 2-3 step turns. \par Postural reflexes are intact.\par

## 2023-06-14 NOTE — HISTORY OF PRESENT ILLNESS
[FreeTextEntry1] : Victoriano Araiza is a 78 year old M with a PMHx of Stroke (2016), Parkinson's disease diagnosed in 2017, DM, and HLD who presents for follow up in the Movement Disorders Clinic at [facility]. He is accompanied by his step daughter who provides collateral history.\par \par Interval history:\par Since the last visit in 2021, he has significant lower extremity swelling. It is improved, per his step daughter. His walking is still slow. He has coordination issues. He was in PT a few times earlier this year, but insurance stopped covering. \par No constipation. \par His liver is improved from last year. \par No trouble sleeping. He continues takes Melatonin 20mg at bedtime. He talks in his sleep and acts out dreams. \par He gets dizziness/lightheadedness when standing. \par She thinks that he has a response to the levodopa. He tends to drink a lot of tea, not necessarily water.\par \par Initial history from 12/2021:\par He was diagnosed with PD in 2017 by another Neurologist when he presented with trembling. Initially he was not provided any medication as symptoms were mild. He was started on Sinemet this year when he was admitted in [facility] for fall. As per daughter he acts out , fights and talks in sleep for past few years. He also suffers from intermittent constipation relieved by dietary modification. His step-daughter noticed some tremor specially when he tries to eat. He had multiple falls in the past, last one was around April/May this year. His walking has improved since he was started on Sinemet. Initially, he had some nausea, but tolerating it well. He takes Sinemet at 8 am, 2 pm, and 8 pm. His daughter also noticed softening of voice, and his wife constantly tells him to speak louder. He c/o dizziness and was told that his BP tends to drop on standing up and his nephrologist is aware of this. He is no longer on BP medications. \par \par When the patient fell the last two times, he was dizzy and lightheaded.\par No history of hallucinations.\par There is intermittent confusion, but his memory is still good.\par Mood is good.\par No drooling.\par He has never tried Melatonin.\par \par No Keila scan done.\par MRI of the brain was reviewed from 4/2021 and showed significant diffuse atrophy, with moderate microvascular disease and midbrain atrophy.

## 2023-06-14 NOTE — DISCUSSION/SUMMARY
[FreeTextEntry1] : Victoriano Araiza is a 78 year old M with a PMHx of Stroke (2016), Parkinson's disease diagnosed in 2017, DM, and HLD who presents for follow up in the Movement Disorders Clinic at [facility]. He is accompanied by his step daughter who provides collateral history.\par \par The patient's history and physical examination are consistent with mild Parkinsonism. 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 He also has constipation and RBD symptoms. \par \par MRI of brain from 04/2021: significant atrophy including midbrain, severe chronic microvascular changes.\par \par Recommendations:\par - Continue Sinemet 25-100mg ER 4 times a day at [id number]\par - Dietary Modification: Mediterranean diet, multivitamins discussed\par - Encouraged to increase exercise and physical activity and maintain an active social and intellectual life.\par - Increase water intake\par - Continue Melatonin 20 mg at bedtime for REM sleep disorder.\par - Continue Aspirin for secondary stroke prevention, likely needs Plavix from cardiac standpoint\par \par RTC 6 months\par \par All questions were answered to the best of my knowledge. I spent 20 minutes with this patient.\par

## 2023-12-18 ENCOUNTER — RX RENEWAL (OUTPATIENT)
Age: 78
End: 2023-12-18

## 2023-12-18 RX ORDER — CARBIDOPA AND LEVODOPA 25; 100 MG/1; MG/1
25-100 TABLET, EXTENDED RELEASE ORAL
Qty: 360 | Refills: 3 | Status: ACTIVE | COMMUNITY
Start: 2021-12-08 | End: 1900-01-01

## 2024-02-14 ENCOUNTER — APPOINTMENT (OUTPATIENT)
Dept: NEUROLOGY | Facility: CLINIC | Age: 79
End: 2024-02-14

## 2025-02-24 NOTE — ED PROVIDER NOTE - NS_EDPROVIDERDISPOUSERTYPE_ED_A_ED
Patient Informed of the above message and states verbal understanding.    Attending Attestation (For Attendings USE Only)...
